# Patient Record
Sex: MALE | Race: WHITE | HISPANIC OR LATINO | Employment: UNEMPLOYED | URBAN - METROPOLITAN AREA
[De-identification: names, ages, dates, MRNs, and addresses within clinical notes are randomized per-mention and may not be internally consistent; named-entity substitution may affect disease eponyms.]

---

## 2017-01-03 ENCOUNTER — HOSPITAL ENCOUNTER (EMERGENCY)
Facility: HOSPITAL | Age: 32
Discharge: HOME | End: 2017-01-04

## 2017-01-03 VITALS
HEART RATE: 91 BPM | RESPIRATION RATE: 18 BRPM | SYSTOLIC BLOOD PRESSURE: 161 MMHG | WEIGHT: 180 LBS | HEIGHT: 72 IN | DIASTOLIC BLOOD PRESSURE: 90 MMHG | TEMPERATURE: 98 F | OXYGEN SATURATION: 97 % | BODY MASS INDEX: 24.38 KG/M2

## 2017-01-03 DIAGNOSIS — S93.401A SPRAIN OF RIGHT ANKLE, UNSPECIFIED LIGAMENT, INITIAL ENCOUNTER: Primary | ICD-10-CM

## 2017-01-03 DIAGNOSIS — M79.673 FOOT PAIN: ICD-10-CM

## 2017-01-03 DIAGNOSIS — S99.911A RIGHT ANKLE INJURY: ICD-10-CM

## 2017-01-03 PROCEDURE — 99283 EMERGENCY DEPT VISIT LOW MDM: CPT

## 2017-01-03 RX ORDER — PREDNISONE 20 MG/1
40 TABLET ORAL DAILY
Qty: 10 TABLET | Refills: 0 | Status: SHIPPED | OUTPATIENT
Start: 2017-01-03 | End: 2017-01-08

## 2017-01-03 NOTE — ED AVS SNAPSHOT
[facility]  [address] [place] [address]               Ney Quintanilla   1/3/2017  9:25 PM   ED    Descripción:  Male : 1985   Departamento:  [facility]           Fernandez Cuidado fue coordinado por:     Provider Role From To    LASHAUN Mead Physician Assistant 17 1731 --      Razón de la arun     Ankle Pain           Diagnósticos de Esta Visita        Comentarios    Sprain of right ankle, unspecified ligament, initial encounter    -  Primario     Right ankle injury         Foot pain           ED Disposition     ED Disposition Condition Comment    Discharge             Lista de tareas           Información de seguimiento     Realice un seguimiento con:  Primary Doctor No    Cuándo:        Recetas para recoger        Disp Refills Start End    predniSONE (DELTASONE) 20 MG tablet 10 tablet 0 1/3/2017 2017    Take 2 tablets (40 mg total) by mouth once daily. - Oral      [facility]     [facility] Enfermeras - Asistencia   Enfermeras registradas de [facility] pueden ayudarle a reservar christina arun, proveer educación para la shruthi, asesoría clínica, y otros servicios de asesoramiento.   Llame para chase servicio gratuito a [phone].             Medicamentos           Mensaje sobre Medicamentos     Verificar los cambios y / o adiciones a fernandez régimen de medicación son los mismos que discutir con fernandez médico. Si cualquiera de estos cambios o adiciones son incorrectos, por favor notifique a fernandez proveedor de atención médica.        EMPEZAR a christelle estos medicamentos NUEVOS        Refills    predniSONE (DELTASONE) 20 MG tablet 0    Sig: Take 2 tablets (40 mg total) by mouth once daily.    Categoría: Print    Vía: Oral           Verifique que la siguiente lista de medicamentos es christina representación exacta de los medicamentos que está tomando actualmente. Si no hay ningunos reportados, la lista puede estar en nicole.  Si no es correcta, por favor póngase en contacto con [place] proveedor de atención médica. Lleve esta lista con usted en dada de emergencia.           Medicamentos Actuales     alprazolam (XANAX) 2 MG Tab Take by mouth 2 (two) times daily as needed.    butalbital-acetaminophen-caffeine -40 mg (FIORICET) -40 mg per tablet Take 1-2 tablets by mouth every 6 (six) hours as needed for Pain.    clonazePAM (KLONOPIN) 1 MG tablet Take 1 tablet (1 mg total) by mouth 3 (three) times daily as needed for Anxiety.    CLONIDINE HCL ORAL Take 0.2 mg by mouth 2 (two) times daily.     hydrochlorothiazide (HYDRODIURIL) 12.5 MG Tab Take 2 tablets (25 mg total) by mouth once daily.    hydrocodone-acetaminophen 5-325mg (NORCO) 5-325 mg per tablet Take 1 tablet by mouth every 4 (four) hours as needed.    ondansetron (ZOFRAN) 4 MG tablet Take 1 tablet (4 mg total) by mouth every 8 (eight) hours as needed.    predniSONE (DELTASONE) 20 MG tablet Take 2 tablets (40 mg total) by mouth once daily.           Información de referencia clínica           Janet signos vitales ac     PS Pulso Temperatura Resp [place] [employer]    161/90 (BP Location: Left arm, Patient Position: Sitting) 91 98 °F (36.7 °C) (Oral) 18 6' (1.829 m) 81.6 kg (180 lb)    SpO2 BMI (IM)                97% 24.41 kg/m2          Alergias     A partir del:  1/3/2017           Reacciones    Flexeril [Cyclobenzaprine]     Ibuprofen     Toradol [Ketorolac] Itching    Tramadol       Vacunas     Administradas en la fecha de la visita:  1/3/2017        None      ED Micro, Lab, POCT     None      ED Imaging Orders     Start Ordered       Status Ordering Provider    01/03/17 2128 01/03/17 2129  X-Ray Foot Complete Right  1 time imaging      Acknowledged     01/03/17 2121 01/03/17 2120  X-Ray Ankle Complete Right  1 time imaging      Acknowledged         Instrucciones a dileep de kendall           Understanding Ankle Sprain    The ankle is the joint where the leg and foot meet. Bones are  held in place by connective tissue called ligaments. When ankle ligaments are stretched to the point of pain and injury, it is called an ankle sprain. A sprain can tear the ligaments. These tears can be very small but still cause pain. Ankle sprains can be mild or severe.  What causes an ankle sprain?  A sprain may occur when you twist your ankle or bend it too far. This can happen when you stumble or fall. Things that can make an ankle sprain more likely include:  · Having had an ankle sprain before  · Playing sports that involve running and jumping. Or playing contact sports such as football or hockey.  · Wearing shoes that dont support your feet and ankles well  · Having ankles with poor strength and flexibility  Symptoms of an ankle sprain  Symptoms may include:  · Pain or soreness in the ankle  · Swelling  · Redness or bruising  · Not being able to walk or put weight on the affected foot  · Reduced range of motion in the ankle  · A popping or tearing feeling at the time the sprain occurs  · An abnormal or dislocated look to the ankle  · Instability or too much range of motion in the ankle  Treatment for an ankle sprain  Treatment focuses on reducing pain and swelling, and avoiding further injury. Treatments may include:  · Resting the ankle. Avoid putting weight on it. This may mean using crutches until the sprain heals.  · Prescription or over-the-counter pain medicines. These help reduce swelling and pain.  · Cold packs. These help reduce pain and swelling.  · Raising your ankle above your heart. This helps reduce swelling.  · Wrapping the ankle with an elastic bandage or ankle brace. This helps reduce swelling and gives some support to the ankle. In rare cases, you may need a cast or boot.  · Stretching and other exercises. These improve flexibility and strength.  · Heat packs. These may be recommended before doing ankle exercises.  Possible complications of an ankle sprain  An ankle that has been weakened  by a sprain can be more likely to have repeated sprains afterward. Doing exercises to strengthen your ankle and improve balance can reduce your risk for repeated sprains. Other possible complications are long-term (chronic) pain or an ankle that remains unstable.  When to call your healthcare provider  Call your healthcare provider right away if you have any of these:  · Fever of 100.4°F (38°C) or higher, or as directed  · Pain, numbness, discoloration, or coldness in the foot or toes  · Pain that gets worse  · Symptoms that dont get better, or get worse  · New symptoms   © 6042-8231 [employer]. [address] [place] [address]. All rights reserved. This information is not intended as a substitute for professional medical care. Always follow your healthcare professional's instructions.          Treating Ankle Sprains  Treatment will depend on how bad your sprain is. For a severe sprain, healing may take 3 months or more.  Right after your injury: Use R.I.C.E.  · Rest: At first, keep weight off the ankle as much as you can. You may be given crutches to help you walk without putting weight on the ankle.  · Ice: Put an ice pack on the ankle for 15 minutes. Remove the pack and wait at least 30 minutes. Repeat for up to 3 days. This helps reduce swelling.  · Compression: To reduce swelling and keep the joint stable, you may need to wrap the ankle with an elastic bandage. For more severe sprains, you may need an ankle brace or a cast.  · Elevation: To reduce swelling, keep your ankle raised above your heart when you sit or lie down.  Medicine  Your healthcare provider may suggest oral non-steroidal anti-inflammatory medicine (NSAIDs), such as ibuprofen. This relieves the pain and helps reduce any swelling. Be sure to take your medicine as directed.  Contrast baths  After 3 days, soak your ankle in warm water for 30 seconds, then in cool water for 30 seconds. Go back and forth for 5 minutes. Doing  this every 2 hours will help keep the swelling down.  Exercises    After about 2 to 3 weeks, you may be given exercises to strengthen the ligaments and muscles in the ankle. Doing these exercises will help prevent another ankle sprain. Exercises may include standing on your toes and then on your heels and doing ankle curls.  Ankle curls  · Sit on the edge of a sturdy table or lie on your back.  · Pull your toes toward you. Then point them away from you. Repeat for 2 to 3 minutes.   © 8060-8995 [employer]. [address] [place] [address]. All rights reserved. This information is not intended as a substitute for professional medical care. Always follow your healthcare professional's instructions.          Registrarse para [facility]     La activación de [place] cuenta [facility] es tan fácil joanna 1-2-3!    1) Ir a [email or web address], seleccione Registrarse Ahora, meter el código de activación y [place] fecha de nacimiento, y seleccione Próximo.    [id number]  Expires: 2/17/2017  9:49 PM      2) Crear un nombre de usuario y contraseña para usar cuando se visita [facility] en el futuro y selecciona christina pregunta de seguridad en dada de que pierda [place] contraseña y seleccione Próximo.    3) Introduzca [place] dirección de correo electrónico y violet clic en Registrarse!    Información Adicional  Si tiene alguna pregunta, por favor, e-mail [email or web address] o llame al [phone] para hablar con nuestro personal. Recuerde, [facility] no debe ser usada para necesidades urgentes. En dada de emergencia médica, llame al [phone].        Smoking Cessation     Si desea dejar de fumar:  · Usted puede ser elegible para recibir servicios gratuitos si usted es un residente de [place] y comenzó a fumar cigarrillos antes del 1 de septiembre de 1988. Llame al Smoking Cessation Trust (SCT) a [phone] o [phone].   Llame 1-800-QUIT-NOW si usted no cumple con los criterios anteriores.             [facility] cumple con las leyes federales aplicables de derechos civiles y no discrimina por motivos de nicole, color, origen nacional, edad, discapacidad, o sexo.        Language Assistance Services     ATTENTION: Language assistance services are available, free of charge. Please call [phone].      ATENCIÓN: Si habla español, tiene a [place] disposición servicios gratuitos de asistencia lingüística. Llame al [phone].     CHÚ Ý: N?u b?n nói Ti?ng Vi?t, có các d?ch v? h? tr? ngôn ng? mi?n phí dành cho b?n. G?i s? [phone].                      [facility]   [address] [address]               Ney Quintanilla   1/3/2017  9:25 PM   ED    Description:  Male : 1985   Department:  [facility]           Your Care was Coordinated By:     Provider Role From To    LASHAUN Mead Physician Assistant 17 2302 --      Reason for Visit     Ankle Pain           Diagnoses this Visit        Comments    Sprain of right ankle, unspecified ligament, initial encounter    -  Primary     Right ankle injury         Foot pain           ED Disposition     ED Disposition Condition Comment    Discharge             To Do List           Follow-up Information     Follow up with Primary Doctor No In 1 week(s).       These Medications        Disp Refills Start End    predniSONE (DELTASONE) 20 MG tablet 10 tablet 0 1/3/2017 2017    Take 2 tablets (40 mg total) by mouth once daily. - Oral      [facility]     [facility] -  Assistance  Registered nurses in the [facility] provide clinical advisement, health education, appointment booking, and other advisory services.  Call for this free service at [phone].             Medications           Message regarding Medications     Verify the changes and/or additions to your medication regime listed below are the same as discussed with your clinician today.  If  any of these changes or additions are incorrect, please notify your healthcare provider.        START taking these NEW medications        Refills    predniSONE (DELTASONE) 20 MG tablet 0    Sig: Take 2 tablets (40 mg total) by mouth once daily.    Class: Print    Route: Oral           Verify that the below list of medications is an accurate representation of the medications you are currently taking.  If none reported, the list may be blank. If incorrect, please contact your healthcare provider. Carry this list with you in case of emergency.           Current Medications     alprazolam (XANAX) 2 MG Tab Take by mouth 2 (two) times daily as needed.    butalbital-acetaminophen-caffeine -40 mg (FIORICET) -40 mg per tablet Take 1-2 tablets by mouth every 6 (six) hours as needed for Pain.    clonazePAM (KLONOPIN) 1 MG tablet Take 1 tablet (1 mg total) by mouth 3 (three) times daily as needed for Anxiety.    CLONIDINE HCL ORAL Take 0.2 mg by mouth 2 (two) times daily.     hydrochlorothiazide (HYDRODIURIL) 12.5 MG Tab Take 2 tablets (25 mg total) by mouth once daily.    hydrocodone-acetaminophen 5-325mg (NORCO) 5-325 mg per tablet Take 1 tablet by mouth every 4 (four) hours as needed.    ondansetron (ZOFRAN) 4 MG tablet Take 1 tablet (4 mg total) by mouth every 8 (eight) hours as needed.    predniSONE (DELTASONE) 20 MG tablet Take 2 tablets (40 mg total) by mouth once daily.           Clinical Reference Information           Your Vitals Were     BP Pulse Temp Resp Height Weight    161/90 (BP Location: Left arm, Patient Position: Sitting) 91 98 °F (36.7 °C) (Oral) 18 6' (1.829 m) 81.6 kg (180 lb)    SpO2 BMI                97% 24.41 kg/m2          Allergies as of 1/3/2017        Reactions    Flexeril [Cyclobenzaprine]     Ibuprofen     Toradol [Ketorolac] Itching    Tramadol       Immunizations Administered on Date of Encounter - 1/3/2017     None      ED Micro, Lab, POCT     None      ED Imaging Orders     Start  Ordered       Status Ordering Provider    01/03/17 2128 01/03/17 2129  X-Ray Foot Complete Right  1 time imaging      Acknowledged     01/03/17 2121 01/03/17 2120  X-Ray Ankle Complete Right  1 time imaging      Acknowledged         Discharge Instructions           Understanding Ankle Sprain    The ankle is the joint where the leg and foot meet. Bones are held in place by connective tissue called ligaments. When ankle ligaments are stretched to the point of pain and injury, it is called an ankle sprain. A sprain can tear the ligaments. These tears can be very small but still cause pain. Ankle sprains can be mild or severe.  What causes an ankle sprain?  A sprain may occur when you twist your ankle or bend it too far. This can happen when you stumble or fall. Things that can make an ankle sprain more likely include:  · Having had an ankle sprain before  · Playing sports that involve running and jumping. Or playing contact sports such as football or hockey.  · Wearing shoes that dont support your feet and ankles well  · Having ankles with poor strength and flexibility  Symptoms of an ankle sprain  Symptoms may include:  · Pain or soreness in the ankle  · Swelling  · Redness or bruising  · Not being able to walk or put weight on the affected foot  · Reduced range of motion in the ankle  · A popping or tearing feeling at the time the sprain occurs  · An abnormal or dislocated look to the ankle  · Instability or too much range of motion in the ankle  Treatment for an ankle sprain  Treatment focuses on reducing pain and swelling, and avoiding further injury. Treatments may include:  · Resting the ankle. Avoid putting weight on it. This may mean using crutches until the sprain heals.  · Prescription or over-the-counter pain medicines. These help reduce swelling and pain.  · Cold packs. These help reduce pain and swelling.  · Raising your ankle above your heart. This helps reduce swelling.  · Wrapping the ankle with an  elastic bandage or ankle brace. This helps reduce swelling and gives some support to the ankle. In rare cases, you may need a cast or boot.  · Stretching and other exercises. These improve flexibility and strength.  · Heat packs. These may be recommended before doing ankle exercises.  Possible complications of an ankle sprain  An ankle that has been weakened by a sprain can be more likely to have repeated sprains afterward. Doing exercises to strengthen your ankle and improve balance can reduce your risk for repeated sprains. Other possible complications are long-term (chronic) pain or an ankle that remains unstable.  When to call your healthcare provider  Call your healthcare provider right away if you have any of these:  · Fever of 100.4°F (38°C) or higher, or as directed  · Pain, numbness, discoloration, or coldness in the foot or toes  · Pain that gets worse  · Symptoms that dont get better, or get worse  · New symptoms   © 2227-9580 "[employer] [address] [place] [address]. All rights reserved. This information is not intended as a substitute for professional medical care. Always follow your healthcare professional's instructions.          Treating Ankle Sprains  Treatment will depend on how bad your sprain is. For a severe sprain, healing may take 3 months or more.  Right after your injury: Use R.I.C.E.  · Rest: At first, keep weight off the ankle as much as you can. You may be given crutches to help you walk without putting weight on the ankle.  · Ice: Put an ice pack on the ankle for 15 minutes. Remove the pack and wait at least 30 minutes. Repeat for up to 3 days. This helps reduce swelling.  · Compression: To reduce swelling and keep the joint stable, you may need to wrap the ankle with an elastic bandage. For more severe sprains, you may need an ankle brace or a cast.  · Elevation: To reduce swelling, keep your ankle raised above your heart when you sit or lie  down.  Medicine  Your healthcare provider may suggest oral non-steroidal anti-inflammatory medicine (NSAIDs), such as ibuprofen. This relieves the pain and helps reduce any swelling. Be sure to take your medicine as directed.  Contrast baths  After 3 days, soak your ankle in warm water for 30 seconds, then in cool water for 30 seconds. Go back and forth for 5 minutes. Doing this every 2 hours will help keep the swelling down.  Exercises    After about 2 to 3 weeks, you may be given exercises to strengthen the ligaments and muscles in the ankle. Doing these exercises will help prevent another ankle sprain. Exercises may include standing on your toes and then on your heels and doing ankle curls.  Ankle curls  · Sit on the edge of a sturdy table or lie on your back.  · Pull your toes toward you. Then point them away from you. Repeat for 2 to 3 minutes.   © 5199-7849 [employer]. [address] [place] [address]. All rights reserved. This information is not intended as a substitute for professional medical care. Always follow your healthcare professional's instructions.          [facility] Sign-Up     Activating your [facility] account is as easy as 1-2-3!     1) Visit [email or web address], select Sign Up Now, enter this activation code and your date of birth, then select Next.  [email or web address]  Expires: 2/17/2017  9:49 PM      2) Create a username and password to use when you visit [facility] in the future and select a security question in case you lose your password and select Next.    3) Enter your e-mail address and click Sign Up!    Additional Information  If you have questions, please e-mail [email or web address] or call [phone] to talk to our [facility] staff. Remember, [facility] is NOT to be used for urgent needs. For medical emergencies, dial 911.         Smoking Cessation     If you would like to quit smoking:   You may be eligible for free services if you are a [place] resident and  started smoking cigarettes before September 1, 1988.  Call the Smoking Cessation Trust (SCT) toll free at (648) 625-1123 or (977) 462-3855.   Call 1-800-QUIT-NOW if you do not meet the above criteria.             [facility] complies with applicable Federal civil rights laws and does not discriminate on the basis of race, color, national origin, age, disability, or sex.        Language Assistance Services     ATTENTION: Language assistance services are available, free of charge. Please call [phone].      ATENCIÓN: Si habla español, tiene a [place] disposición servicios gratuitos de asistencia lingüística. Llame al [phone].     CHÚ Ý: N?u b?n nói Ti?ng Vi?t, có các d?ch v? h? tr? ngôn ng? mi?n phí dành cho b?n. G?i s? [phone].

## 2017-01-04 NOTE — ED NOTES
Patient identifiers verified and correct for Ney Quintanilla.    LOC: The patient is awake, alert and aware of environment with an appropriate affect, the patient is oriented x 3 and speaking appropriately.  APPEARANCE: Patient resting comfortably and in no acute distress, patient is clean and well groomed, patient's clothing is properly fastened.  SKIN: The skin is warm and dry, color consistent with ethnicity, patient has normal skin turgor and moist mucus membranes, skin intact, no breakdown or bruising noted.  MUSCULOSKELETAL: R ankle swelling s/p fall.   RESPIRATORY: Airway is open and patent, respirations are spontaneous.  CARDIAC: Patient has a normal rate, no periphreal edema noted, capillary refill < 3 seconds.  ABDOMEN: Soft and non tender to palpation.

## 2017-01-04 NOTE — DISCHARGE INSTRUCTIONS
Understanding Ankle Sprain    The ankle is the joint where the leg and foot meet. Bones are held in place by connective tissue called ligaments. When ankle ligaments are stretched to the point of pain and injury, it is called an ankle sprain. A sprain can tear the ligaments. These tears can be very small but still cause pain. Ankle sprains can be mild or severe.  What causes an ankle sprain?  A sprain may occur when you twist your ankle or bend it too far. This can happen when you stumble or fall. Things that can make an ankle sprain more likely include:  · Having had an ankle sprain before  · Playing sports that involve running and jumping. Or playing contact sports such as football or hockey.  · Wearing shoes that dont support your feet and ankles well  · Having ankles with poor strength and flexibility  Symptoms of an ankle sprain  Symptoms may include:  · Pain or soreness in the ankle  · Swelling  · Redness or bruising  · Not being able to walk or put weight on the affected foot  · Reduced range of motion in the ankle  · A popping or tearing feeling at the time the sprain occurs  · An abnormal or dislocated look to the ankle  · Instability or too much range of motion in the ankle  Treatment for an ankle sprain  Treatment focuses on reducing pain and swelling, and avoiding further injury. Treatments may include:  · Resting the ankle. Avoid putting weight on it. This may mean using crutches until the sprain heals.  · Prescription or over-the-counter pain medicines. These help reduce swelling and pain.  · Cold packs. These help reduce pain and swelling.  · Raising your ankle above your heart. This helps reduce swelling.  · Wrapping the ankle with an elastic bandage or ankle brace. This helps reduce swelling and gives some support to the ankle. In rare cases, you may need a cast or boot.  · Stretching and other exercises. These improve flexibility and strength.  · Heat packs. These may be recommended before  doing ankle exercises.  Possible complications of an ankle sprain  An ankle that has been weakened by a sprain can be more likely to have repeated sprains afterward. Doing exercises to strengthen your ankle and improve balance can reduce your risk for repeated sprains. Other possible complications are long-term (chronic) pain or an ankle that remains unstable.  When to call your healthcare provider  Call your healthcare provider right away if you have any of these:  · Fever of 100.4°F (38°C) or higher, or as directed  · Pain, numbness, discoloration, or coldness in the foot or toes  · Pain that gets worse  · Symptoms that dont get better, or get worse  · New symptoms   © 0125-0643 [employer]. [address] [place] [address], [place] [address]. All rights reserved. This information is not intended as a substitute for professional medical care. Always follow your healthcare professional's instructions.          Treating Ankle Sprains  Treatment will depend on how bad your sprain is. For a severe sprain, healing may take 3 months or more.  Right after your injury: Use R.I.C.E.  · Rest: At first, keep weight off the ankle as much as you can. You may be given crutches to help you walk without putting weight on the ankle.  · Ice: Put an ice pack on the ankle for 15 minutes. Remove the pack and wait at least 30 minutes. Repeat for up to 3 days. This helps reduce swelling.  · Compression: To reduce swelling and keep the joint stable, you may need to wrap the ankle with an elastic bandage. For more severe sprains, you may need an ankle brace or a cast.  · Elevation: To reduce swelling, keep your ankle raised above your heart when you sit or lie down.  Medicine  Your healthcare provider may suggest oral non-steroidal anti-inflammatory medicine (NSAIDs), such as ibuprofen. This relieves the pain and helps reduce any swelling. Be sure to take your medicine as directed.  Contrast baths  After 3 days, soak your ankle in  warm water for 30 seconds, then in cool water for 30 seconds. Go back and forth for 5 minutes. Doing this every 2 hours will help keep the swelling down.  Exercises    After about 2 to 3 weeks, you may be given exercises to strengthen the ligaments and muscles in the ankle. Doing these exercises will help prevent another ankle sprain. Exercises may include standing on your toes and then on your heels and doing ankle curls.  Ankle curls  · Sit on the edge of a sturdy table or lie on your back.  · Pull your toes toward you. Then point them away from you. Repeat for 2 to 3 minutes.   © 8613-8825 [employer]. [address] [place] [address], [place] [address]. All rights reserved. This information is not intended as a substitute for professional medical care. Always follow your healthcare professional's instructions.

## 2017-01-04 NOTE — ED PROVIDER NOTES
SCRIBE #1 NOTE: I, Aura Morin, am scribing for, and in the presence of, LASHAUN Castaneda. I have scribed the entire note.      History      Chief Complaint   Patient presents with    Ankle Pain     twisted rt ankle earlier today       Review of patient's allergies indicates:   Allergen Reactions    Flexeril [cyclobenzaprine]     Ibuprofen     Toradol [ketorolac] Itching    Tramadol         HPI   HPI    1/3/2017, 9:26 PM   History obtained from the patient      History of Present Illness: Ney Quintanilla is a 31 y.o. male patient who presents to the Emergency Department for R ankle injury which onset suddenly after falling off the ladder and twisting his ankle earlier today. Symptoms are constant and mild in severity. Pt does not report any factors that exacerbate or improve the symptoms. No other sxs reported. Patient denies extremity weakness/numbness, paresthesias, saddle anesthesia, joint swelling, bruising, erythema, and all other sxs at this time. No further complaints or concerns at this time.       Arrival mode: Personal vehicle    PCP: Primary Doctor No       Past Medical History:  Past Medical History   Diagnosis Date    Anxiety     GERD (gastroesophageal reflux disease)     Hypertension        Past Surgical History:  History reviewed. No pertinent past surgical history.      Family History:  Family History   Problem Relation Age of Onset    Hypertension Mother     Diabetes Mother     Heart disease Father     Hypertension Father     Diabetes Father        Social History:  Social History     Social History Main Topics    Smoking status: Current Every Day Smoker     Packs/day: 0.50     Types: Cigarettes    Smokeless tobacco: Never Used    Alcohol use Yes    Drug use: Yes     Special: Cocaine    Sexual activity: Yes       ROS   Review of Systems   Constitutional: Negative for fever.   HENT: Negative for sore throat.    Respiratory: Negative for shortness of breath.    Cardiovascular:  Negative for chest pain.   Gastrointestinal: Negative for nausea.   Genitourinary: Negative for dysuria.   Musculoskeletal: Negative for back pain, gait problem and joint swelling.        (+) R ankle pain   Skin: Negative for color change and rash.   Neurological: Negative for weakness and numbness.        (-) paresthesias   Hematological: Does not bruise/bleed easily.   All other systems reviewed and are negative.      Physical Exam    Initial Vitals   BP Pulse Resp Temp SpO2   01/03/17 2042 01/03/17 2042 01/03/17 2042 01/03/17 2042 01/03/17 2042   161/90 91 18 98 °F (36.7 °C) 97 %      Physical Exam  Nursing Notes and Vital Signs Reviewed.  Constitutional: Patient is in no acute distress. Awake and alert. Well-developed and well-nourished.  Head: Atraumatic. Normocephalic.  Eyes: PERRL. EOM intact. Conjunctivae are not pale. No scleral icterus.  ENT: Mucous membranes are moist. Oropharynx is clear and symmetric.    Neck: Supple. Full ROM. No lymphadenopathy.  Cardiovascular: Regular rate. Regular rhythm. No murmurs, rubs, or gallops. Distal pulses are 2+ and symmetric.  Pulmonary/Chest: No respiratory distress. Clear to auscultation bilaterally. No wheezing, rales, or rhonchi.  Abdominal: Soft and non-distended.  There is no tenderness.  No rebound, guarding, or rigidity.  Good bowel sounds.    Musculoskeletal: Moves all extremities. No obvious deformities.  Right foot: No obvious deformity. Tenderness to dorsal aspect of right foot. There is no swelling. Distal capillary refill takes less than 2 seconds. PT and DP pulses are 2+ bilaterally.  Skin: Warm and dry.  Neurological:  Alert, awake, and appropriate.  Normal speech.  No acute focal neurological deficits are appreciated.  Psychiatric: Normal affect. Good eye contact. Appropriate in content.    ED Course    Procedures  ED Vital Signs:  Vitals:    01/03/17 2042   BP: (!) 161/90   Pulse: 91   Resp: 18   Temp: 98 °F (36.7 °C)   TempSrc: Oral   SpO2: 97%    Weight: 81.6 kg (180 lb)   Height: 6' (1.829 m)     Imaging Results:  Imaging Results         X-Ray Foot Complete Right (Final result) Result time:  01/03/17 22:12:35    Final result by Jose A Gonzalez MD (01/03/17 22:12:35)    Impression:         Negative right foot fracture.      Electronically signed by: JOSE A GONZALEZ MD  Date:     01/03/17  Time:    22:12     Narrative:    Exam: XR FOOT COMPLETE 3 VIEW RIGHT,    Date:  01/03/17 21:56:15    History: Left foot pain    Comparison:  No prior  studies available for comparison.    Findings:     No acute fracture dislocation left foot.  Lucency identified proximal phalanx of the right 2nd toe on the oblique image only most likely represents artifact.  Joint spaces are well-maintained.  Soft tissues are normal.            X-Ray Ankle Complete Right (Final result) Result time:  01/03/17 22:15:06    Final result by Jose A Gonzalez MD (01/03/17 22:15:06)    Impression:         Negative for acute fracture dislocation.  No change since 09/06/2016.      Electronically signed by: JOSE A GONZALEZ MD  Date:     01/03/17  Time:    22:15     Narrative:    Exam: XR ANKLE COMPLETE 3 VIEW RIGHT,    Date:  01/03/17 21:56:12        History: Right ankle pain right ankle injury.    Comparison exam: 09/06/2016.    Findings:     No fracture or dislocation of the right ankle.  Joint spaces well-maintained.  Soft tissues normal.               The Emergency Provider reviewed the vital signs and test results, which are outlined above.    ED Discussion     9:50 PM: Reassessed pt at this time. Pt states his condition has improved at this time. Discussed with pt imaging results. Discussed pt dx and plan of tx. Informed pt to follow up with PCP. All questions and concerns were addressed at this time. Pt expresses understanding of information and instructions, and is comfortable with plan to discharge. Pt is stable for discharge.    I discussed with patient and/or  family/caretaker that negative X-ray does not rule out occult fracture or other soft tissue injury.  Persistent pain greater than 7-10 days or increased pain requires follow up, specifically with orthopedics.       ED Medication(s):  Medications - No data to display    Discharge Medication List as of 1/3/2017  9:49 PM      START taking these medications    Details   predniSONE (DELTASONE) 20 MG tablet Take 2 tablets (40 mg total) by mouth once daily., Starting 1/3/2017, Until Sun 1/8/17, Print             Follow-up Information     Follow up with Primary Doctor No In 1 week(s).           Medical Decision Making    Medical Decision Making:   Clinical Tests:   Radiological Study: Ordered and Reviewed           Scribe Attestation:   Scribe #1: I performed the above scribed service and the documentation accurately describes the services I performed. I attest to the accuracy of the note.    Attending:   Physician Attestation Statement for Scribe #1: I, LASHAUN Castaneda, personally performed the services described in this documentation, as scribed by Aura Morin, in my presence, and it is both accurate and complete.          Clinical Impression       ICD-10-CM ICD-9-CM   1. Sprain of right ankle, unspecified ligament, initial encounter S93.401A 845.00   2. Right ankle injury S99.911A 959.7   3. Foot pain M79.673 729.5       Disposition:   Disposition: Discharged  Condition: Stable         LASHAUN Mead  01/06/17 0759

## 2017-01-04 NOTE — ED NOTES
Bed: RWR 02  Expected date:   Expected time:   Means of arrival:   Comments:     LASHAUN Mead  01/03/17 3625

## 2017-03-24 ENCOUNTER — HOSPITAL ENCOUNTER (EMERGENCY)
Facility: HOSPITAL | Age: 32
Discharge: HOME | End: 2017-03-25
Attending: INTERNAL MEDICINE

## 2017-03-24 DIAGNOSIS — R10.31 RLQ ABDOMINAL PAIN: Primary | ICD-10-CM

## 2017-03-24 DIAGNOSIS — K52.9 COLITIS: ICD-10-CM

## 2017-03-24 PROCEDURE — 96374 THER/PROPH/DIAG INJ IV PUSH: CPT

## 2017-03-24 PROCEDURE — 96361 HYDRATE IV INFUSION ADD-ON: CPT

## 2017-03-24 PROCEDURE — 99284 EMERGENCY DEPT VISIT MOD MDM: CPT | Mod: 25

## 2017-03-24 NOTE — ED AVS SNAPSHOT
[facility]  [address] [facility] [address]               Ney Quintanilla   3/24/2017 11:43 PM   ED    Descripción:  Male : 1985   Departamento:  [facility]           [place] Cuidado fue coordinado por:     Provider Role From To    Meghan Goodwin MD Attending Provider 17 [phone] --      Razón de la arun     Abdominal Pain           Diagnósticos de Esta Visita        Comentarios    RLQ abdominal pain    -  Primario     Colitis           ED Disposition     Ninguna           Lista de tareas           Información de seguimiento     Realice un seguimiento con:  [facility]    Cuándo:  3/28/2017    Especialidad:  Emergency Medicine    Por qué:  If symptoms worsen    Información de contacto:    [address] [facility] [id number] [phone]      Recetas para recoger        Disp Refills Start End    ciprofloxacin HCl (CIPRO) 500 MG tablet 20 tablet 0 3/25/2017 2017    Take 1 tablet (500 mg total) by mouth 2 (two) times daily. - Oral    metronidazole (FLAGYL) 500 MG tablet 21 tablet 0 3/25/2017 2017    Take 1 tablet (500 mg total) by mouth 3 (three) times daily. - Oral    hyoscyamine (ANASPAZ,LEVSIN) 0.125 mg Tab 30 tablet 0 3/25/2017 2017    Take 1 tablet (125 mcg total) by mouth every 6 (six) hours as needed. - Oral      [facility]     [facility] de Enfermeras - Asistencia   Enfermeras registradas de [facility] pueden ayudarle a reservar christina arun, proveer educación para la shruthi, asesoría clínica, y otros servicios de asesoramiento.   Llame para chase servicio gratuito a [phone].             Medicamentos           Mensaje sobre Medicamentos     Verificar los cambios y / o adiciones a [place] régimen de medicación son los mismos que discutir con [place] médico. Si cualquiera de estos cambios o adiciones son incorrectos, por favor notifique a [place] proveedor de atención  médica.        EMPEZAR a christelle estos medicamentos NUEVOS        Refills    ciprofloxacin HCl (CIPRO) 500 MG tablet 0    Sig: Take 1 tablet (500 mg total) by mouth 2 (two) times daily.    Categoría: Print    Vía: Oral    metronidazole (FLAGYL) 500 MG tablet 0    Sig: Take 1 tablet (500 mg total) by mouth 3 (three) times daily.    Categoría: Print    Vía: Oral    hyoscyamine (ANASPAZ,LEVSIN) 0.125 mg Tab 0    Sig: Take 1 tablet (125 mcg total) by mouth every 6 (six) hours as needed.    Categoría: Print    Vía: Oral      These medications were administered today        Dose Freq    lactated ringers bolus 1,000 mL 1,000 mL ED 1 Time    Empezando el: 3/25/2017    Sig: Inject 1,000 mLs into the vein ED 1 Time.    Categoría: Normal    Vía: Intravenous    hydromorphone (PF) injection 2 mg 2 mg ED 1 Time    Sig: Inject 1 mL (2 mg total) into the vein ED 1 Time.    Categoría: Normal    Vía: Intravenous    ondansetron disintegrating tablet 8 mg 8 mg ED 1 Time    Sig: Take 1 tablet (8 mg total) by mouth ED 1 Time.    Categoría: Normal    Vía: Oral    omnipaque 350 iohexol 75 mL 75 mL IMG once as needed    Sig: Inject 75 mLs into the vein ONCE PRN for contrast.    Categoría: Normal    Vía: Intravenous           Verifique que la siguiente lista de medicamentos es christina representación exacta de los medicamentos que está tomando actualmente. Si no hay ningunos reportados, la lista puede estar en nicole. Si no es correcta, por favor póngase en contacto con [place] proveedor de atención médica. Lleve esta lista con usted en dada de emergencia.           Medicamentos Actuales     alprazolam (XANAX) 2 MG Tab Take by mouth 2 (two) times daily as needed.    clonazePAM (KLONOPIN) 1 MG tablet Take 1 tablet (1 mg total) by mouth 3 (three) times daily as needed for Anxiety.    CLONIDINE HCL ORAL Take 0.2 mg by mouth 2 (two) times daily.     hydrochlorothiazide (HYDRODIURIL) 12.5 MG Tab Take 2 tablets (25 mg total) by mouth once daily.     hydrocodone-acetaminophen 5-325mg (NORCO) 5-325 mg per tablet Take 1 tablet by mouth every 4 (four) hours as needed.    ondansetron (ZOFRAN) 4 MG tablet Take 1 tablet (4 mg total) by mouth every 8 (eight) hours as needed.    ciprofloxacin HCl (CIPRO) 500 MG tablet Take 1 tablet (500 mg total) by mouth 2 (two) times daily.    hyoscyamine (ANASPAZ,LEVSIN) 0.125 mg Tab Take 1 tablet (125 mcg total) by mouth every 6 (six) hours as needed.    metronidazole (FLAGYL) 500 MG tablet Take 1 tablet (500 mg total) by mouth 3 (three) times daily.           Información de referencia clínica           Jante signos vitales ac     PS Pulso Temperatura Resp [place] Peso    132/78 81 97.9 °F (36.6 °C) (Oral) 22 6' (1.829 m) 81.6 kg (180 lb)    SpO2 BMI (IM)                98% 24.41 kg/m2          Alergias     A partir del:  3/25/2017           Reacciones    Flexeril [Cyclobenzaprine]     Ibuprofen     Toradol [Ketorolac] Itching    Tramadol       Vacunas     Administradas en la fecha de la visita:  3/25/2017        None      ED Micro, Lab, POCT     Start Ordered       Status Ordering Provider    03/24/17 2347 03/24/17 2347  CBC W/ AUTO DIFFERENTIAL  STAT      Final result     03/24/17 2347 03/24/17 2347  Comp. Metabolic Panel  STAT      Final result     03/24/17 2347 03/24/17 2347  Lipase  Once      Final result     03/24/17 2347 03/24/17 2347  Urinalysis - Clean Catch  STAT      Final result       ED Imaging Orders     Start Ordered       Status Ordering Provider    03/24/17 2347 03/24/17 2347  CT Abdomen Pelvis With Contrast  1 time imaging      In process         Instrucciones a dileep de kendall         Dolor Abdominal    El dolor abdominal es dolor en el vientre o en la jennifer del intestino. Todo el jeanna tiene chase tipo de dolor de vez en cuando. En muchos casos el dolor desaparece por sí solo. Yamilka en ciertas ocasiones el dolor abdominal puede ser consecuencia de un problema grave, joanna por ejemplo christina apendicitis. Por lo tanto, es  importante saber cuándo se debe obtener ayuda.  Causas del dolor abdominal  El dolor abdominal puede tener muchas causas. Entre las causas más comunes, en los adultos, se encuentran las siguientes:   · Estreñimiento, diarrea o gases  · Reflujo gastroesofágico (desplazamiento del ácido estomacal hacia el esófago, también llamado reflujo ácido o ardor estomacal)  · Úlcera péptica (lesión en el revestimiento interno del estómago o del intestino tinsley)  · Inflamación de la vesícula biliar, el hígado o el páncreas  · Cálculos biliares o renales  · Apendicitis  · Obstrucción del intestino  · Hernia (protrusión o abultamiento de un órgano interno a través de un músculo u otro tejido)  · Infecciones de las vías urinarias  · En las mujeres, cólicos menstruales, fibromas o endometriosis del útero  · Inflamación o infección del intestino  Diagnóstico de la causa del dolor abdominal  [place] proveedor de atención médica le hará un examen para ayudar a determinar la causa de [place] dolor. En dada necesario, le harán ciertas pruebas. El dolor abdominal puede ser difícil de diagnosticar porque honey causas pueden ser muy diversas. Los detalles que usted sepa dileep acerca de [place] dolor pueden resultar útiles. Diga a [place] proveedor de atención médica dónde y cuándo siente el dolor y qué cosas lo alivian o lo empeoran. Mencione también si tiene otros síntomas joanna fiebre, cansancio, náuseas, vómito o cambios en la frecuencia con que evacúa honey intestinos o [place] vejiga.  Tratamiento del dolor abdominal  Ciertas causas de chase dolor, joanna christina apendicitis o christina obstrucción intestinal, requieren tratamiento urgente. Otros problemas pueden tratarse mediante descanso, consumo de líquidos o medicamentos. [place] proveedor de atención médica le dará instrucciones específicas para el tratamiento que debe seguir o cómo debe cuidarse según la causa de [place] dolor.   Si tiene vómito o diarrea, simon pequeños sorbos de agua u otros líquidos nida. Cuando esté listo  para comer de nuevo alimentos sólidos, empiece comiendo pequeñas cantidades de cosas fáciles de digerir, joanna puré de manzanas, pan raphael o galletas saladas.   Cuándo debe llamar al médico  Llame al 911 o vaya al [facility] si tiene alguno de los siguientes síntomas:  · No puede defecar y está vomitando  · Está vomitando juan jose o tiene diarrea de color negruzco o muy oscuro  · Tiene también dolor en el pecho, en el paul o en el hombro  · Siente que está a punto de desmayarse  · Tiene dolor en los omóplatos, con náuseas  · Tiene un dolor repentino e insoportable en el abdomen  · Tiene un nuevo dolor distinto de todos los ab que ha tenido antes  · Tiene el vientre rígido, rajani y sensible al tacto  Llame a [place] médico si tiene:  · Dolor alison más de 5 días  · Abotargamiento (sensación de llenura e inflamiento) alison más de 2 días  · Diarrea alison más de 5 días  · Fiebre superior a 101°F o más  · Dolor que continúa aumentando  · Pérdida de peso sin motivo aparente  · Falta de apetito persistente  · Juan Jose en las heces  Cómo prevenir el dolor abdominal  Estos son algunos consejos para ayudar a prevenir el dolor abdominal:  · Coma cantidades más pequeñas de alimentos en cada comida.  · Evite los alimentos fritos o que contengan mucha grasa.  · Evite los alimentos que le producen gas.  · Mohsen ejercicio con regularidad.  · Genie abundantes líquidos.  Para ayudar a prevenir los síntomas del reflujo gastroesofágico:  · Deje de fumar.  · Genie menos alcohol y coma menos de esos alimentos que aumentan [place] acidez estomacal.  · Pierda el exceso de peso.  · Termine de comer joanna mínimo 2 horas antes de acostarse.  · Eleve un poco la cabecera de [place] cama.  Date Last Reviewed: 3/30/2014  © 7158-3215 [employer]. [address] [place] [id number]. Todos los derechos reservados. Esta información no pretende sustituir la atención médica profesional. Sólo [place] médico puede diagnosticar y tratar un  problema de shruthi.          Referencias/Adjuntos de kendall     GASTROENTERITIS, BACTERIAL (ADULT) (Yemeni)      Registrarse para [facility]     La activación de [place] cuenta [facility] es tan fácil joanna 1-2-3!    1) Ir a my.[email or web address] [email or web address], seleccione Registrarse Ahora, meter el código de activación y [place] fecha de nacimiento, y seleccione Próximo.    [id number]  Expires: 5/9/2017  1:38 AM      2) Crear un nombre de usuario y contraseña para usar cuando se visita [facility] en el futuro y selecciona christina pregunta de seguridad en dada de que pierda [place] contraseña y seleccione Próximo.    3) Introduzca [place] dirección de correo electrónico y violet clic en Registrarse!    Información Adicional  Si tiene alguna pregunta, por favor, e-mail [email or web address] o llame al [phone] para hablar con nuestro personal. Recuerde, [facility] no debe ser usada para necesidades urgentes. En dada de emergencia médica, llame al 911.        Smoking Cessation     Si desea dejar de fumar:  · Usted puede ser elegible para recibir servicios gratuitos si usted es un residente de [place] y comenzó a fumar cigarrillos antes del 1 de septiembre de 1988. Llame al Smoking Cessation Trust (SCT) a [phone] o [phone].   Llame 1-800-QUIT-NOW si usted no cumple con los criterios anteriores.             [facility]  cumple con las leyes federales aplicables de derechos civiles y no discrimina por motivos de nicole, color, origen nacional, edad, discapacidad, o sexo.        Language Assistance Services     ATTENTION: Language assistance services are available, free of charge. Please call [phone].      ATENCIÓN: Si habla español, tiene a [place] disposición servicios gratuitos de asistencia lingüística. Tamia al [phone].     ANAYELI Ý: N?u b?n nói Ti?ng Vi?t, có các d?ch v? h? tr? ngôn ng? mi?n phí dành cho b?n. G?i s? [phone].                      [facility]  [address] [facility]  [address]               Ney Quintanilla   3/24/2017 11:43 PM   ED    Description:  Male : 1985   Department:  [facility]            Your Care was Coordinated By:     Provider Role From To    Meghan Goodwin MD Attending Provider 17 1557 --      Reason for Visit     Abdominal Pain           Diagnoses this Visit        Comments    RLQ abdominal pain    -  Primary     Colitis           ED Disposition     None           To Do List           Follow-up Information     Follow up with [facility] -  In 3 days.    Specialty:  Emergency Medicine    Why:  If symptoms worsen    Contact information:    [address]  [facility] [id number] [phone]       These Medications        Disp Refills Start End    ciprofloxacin HCl (CIPRO) 500 MG tablet 20 tablet 0 3/25/2017 2017    Take 1 tablet (500 mg total) by mouth 2 (two) times daily. - Oral    metronidazole (FLAGYL) 500 MG tablet 21 tablet 0 3/25/2017 2017    Take 1 tablet (500 mg total) by mouth 3 (three) times daily. - Oral    hyoscyamine (ANASPAZ,LEVSIN) 0.125 mg Tab 30 tablet 0 3/25/2017 2017    Take 1 tablet (125 mcg total) by mouth every 6 (six) hours as needed. - Oral      [facility]     [facility] -  Assistance  Registered nurses in the [facility] provide clinical advisement, health education, appointment booking, and other advisory services.  Call for this free service at [phone].             Medications           Message regarding Medications     Verify the changes and/or additions to your medication regime listed below are the same as discussed with your clinician today.  If any of these changes or additions are incorrect, please notify your healthcare provider.        START taking these NEW medications        Refills    ciprofloxacin HCl (CIPRO) 500 MG tablet 0    Sig: Take 1 tablet (500 mg total) by mouth 2 (two) times daily.     Class: Print    Route: Oral    metronidazole (FLAGYL) 500 MG tablet 0    Sig: Take 1 tablet (500 mg total) by mouth 3 (three) times daily.    Class: Print    Route: Oral    hyoscyamine (ANASPAZ,LEVSIN) 0.125 mg Tab 0    Sig: Take 1 tablet (125 mcg total) by mouth every 6 (six) hours as needed.    Class: Print    Route: Oral      These medications were administered today        Dose Freq    lactated ringers bolus 1,000 mL 1,000 mL ED 1 Time    Starting on: 3/25/2017    Sig: Inject 1,000 mLs into the vein ED 1 Time.    Class: Normal    Route: Intravenous    hydromorphone (PF) injection 2 mg 2 mg ED 1 Time    Sig: Inject 1 mL (2 mg total) into the vein ED 1 Time.    Class: Normal    Route: Intravenous    ondansetron disintegrating tablet 8 mg 8 mg ED 1 Time    Sig: Take 1 tablet (8 mg total) by mouth ED 1 Time.    Class: Normal    Route: Oral    omnipaque 350 iohexol 75 mL 75 mL IMG once as needed    Sig: Inject 75 mLs into the vein ONCE PRN for contrast.    Class: Normal    Route: Intravenous           Verify that the below list of medications is an accurate representation of the medications you are currently taking.  If none reported, the list may be blank. If incorrect, please contact your healthcare provider. Carry this list with you in case of emergency.           Current Medications     alprazolam (XANAX) 2 MG Tab Take by mouth 2 (two) times daily as needed.    clonazePAM (KLONOPIN) 1 MG tablet Take 1 tablet (1 mg total) by mouth 3 (three) times daily as needed for Anxiety.    CLONIDINE HCL ORAL Take 0.2 mg by mouth 2 (two) times daily.     hydrochlorothiazide (HYDRODIURIL) 12.5 MG Tab Take 2 tablets (25 mg total) by mouth once daily.    hydrocodone-acetaminophen 5-325mg (NORCO) 5-325 mg per tablet Take 1 tablet by mouth every 4 (four) hours as needed.    ondansetron (ZOFRAN) 4 MG tablet Take 1 tablet (4 mg total) by mouth every 8 (eight) hours as needed.    ciprofloxacin HCl (CIPRO) 500 MG tablet Take 1  tablet (500 mg total) by mouth 2 (two) times daily.    hyoscyamine (ANASPAZ,LEVSIN) 0.125 mg Tab Take 1 tablet (125 mcg total) by mouth every 6 (six) hours as needed.    metronidazole (FLAGYL) 500 MG tablet Take 1 tablet (500 mg total) by mouth 3 (three) times daily.           Clinical Reference Information           Your Vitals Were     BP Pulse Temp Resp Height Weight    132/78 81 97.9 °F (36.6 °C) (Oral) 22 6' (1.829 m) 81.6 kg (180 lb)    SpO2 BMI                98% 24.41 kg/m2          Allergies as of 3/25/2017        Reactions    Flexeril [Cyclobenzaprine]     Ibuprofen     Toradol [Ketorolac] Itching    Tramadol       Immunizations Administered on Date of Encounter - 3/25/2017     None      ED Micro, Lab, POCT     Start Ordered       Status Ordering Provider    03/24/17 2347 03/24/17 2347  CBC W/ AUTO DIFFERENTIAL  STAT      Final result     03/24/17 2347 03/24/17 2347  Comp. Metabolic Panel  STAT      Final result     03/24/17 2347 03/24/17 2347  Lipase  Once      Final result     03/24/17 2347 03/24/17 2347  Urinalysis - Clean Catch  STAT      Final result       ED Imaging Orders     Start Ordered       Status Ordering Provider    03/24/17 2347 03/24/17 2347  CT Abdomen Pelvis With Contrast  1 time imaging      In process         Discharge Instructions         Dolor Abdominal    El dolor abdominal es dolor en el vientre o en la jennifer del intestino. Todo el jeanan tiene chase tipo de dolor de vez en cuando. En muchos casos el dolor desaparece por sí solo. Yamilka en ciertas ocasiones el dolor abdominal puede ser consecuencia de un problema grave, joanna por ejemplo christina apendicitis. Por lo tanto, es importante saber cuándo se debe obtener ayuda.  Causas del dolor abdominal  El dolor abdominal puede tener muchas causas. Entre las causas más comunes, en los adultos, se encuentran las siguientes:   · Estreñimiento, diarrea o gases  · Reflujo gastroesofágico (desplazamiento del ácido estomacal hacia el esófago, también  llamado reflujo ácido o ardor estomacal)  · Úlcera péptica (lesión en el revestimiento interno del estómago o del intestino tinsley)  · Inflamación de la vesícula biliar, el hígado o el páncreas  · Cálculos biliares o renales  · Apendicitis  · Obstrucción del intestino  · Hernia (protrusión o abultamiento de un órgano interno a través de un músculo u otro tejido)  · Infecciones de las vías urinarias  · En las mujeres, cólicos menstruales, fibromas o endometriosis del útero  · Inflamación o infección del intestino  Diagnóstico de la causa del dolor abdominal  [place] proveedor de atención médica le hará un examen para ayudar a determinar la causa de [place] dolor. En dada necesario, le harán ciertas pruebas. El dolor abdominal puede ser difícil de diagnosticar porque honey causas pueden ser muy diversas. Los detalles que usted sepa dileep acerca de [place] dolor pueden resultar útiles. Diga a [place] proveedor de atención médica dónde y cuándo siente el dolor y qué cosas lo alivian o lo empeoran. Mencione también si tiene otros síntomas joanna fiebre, cansancio, náuseas, vómito o cambios en la frecuencia con que evacúa honye intestinos o [place] vejiga.  Tratamiento del dolor abdominal  Ciertas causas de chase dolor, joanna christina apendicitis o christina obstrucción intestinal, requieren tratamiento urgente. Otros problemas pueden tratarse mediante descanso, consumo de líquidos o medicamentos. [place] proveedor de atención médica le dará instrucciones específicas para el tratamiento que debe seguir o cómo debe cuidarse según la causa de [place] dolor.   Si tiene vómito o diarrea, simon pequeños sorbos de agua u otros líquidos nida. Cuando esté listo para comer de nuevo alimentos sólidos, empiece comiendo pequeñas cantidades de cosas fáciles de digerir, joanna puré de manzanas, pan raphael o galletas saladas.   Cuándo debe llamar al médico  Llame al 911 o vaya al hospital de inmediato si tiene alguno de los siguientes síntomas:  · No puede defecar y está  vomitando  · Está vomitando juan jose o tiene diarrea de color negruzco o muy oscuro  · Tiene también dolor en el pecho, en el paul o en el hombro  · Siente que está a punto de desmayarse  · Tiene dolor en los omóplatos, con náuseas  · Tiene un dolor repentino e insoportable en el abdomen  · Tiene un nuevo dolor distinto de todos los ab que ha tenido antes  · Tiene el vientre rígido, rajani y sensible al tacto  Llame a [place] médico si tiene:  · Dolor alison más de 5 días  · Abotargamiento (sensación de llenura e inflamiento) alison más de 2 días  · Diarrea alison más de 5 días  · Fiebre superior a 101°F o más  · Dolor que continúa aumentando  · Pérdida de peso sin motivo aparente  · Falta de apetito persistente  · Juan Jose en las heces  Cómo prevenir el dolor abdominal  Estos son algunos consejos para ayudar a prevenir el dolor abdominal:  · Coma cantidades más pequeñas de alimentos en cada comida.  · Evite los alimentos fritos o que contengan mucha grasa.  · Evite los alimentos que le producen gas.  · Mohsen ejercicio con regularidad.  · Genie abundantes líquidos.  Para ayudar a prevenir los síntomas del reflujo gastroesofágico:  · Deje de fumar.  · Genie menos alcohol y coma menos de esos alimentos que aumentan [place] acidez estomacal.  · Pierda el exceso de peso.  · Termine de comer joanna mínimo 2 horas antes de acostarse.  · Eleve un poco la cabecera de [place] cama.  Date Last Reviewed: 3/30/2014  © 3894-4207 [employer]. [address] [place] [address], [place] [address]. Todos los derechos reservados. Esta información no pretende sustituir la atención médica profesional. Sólo [place] médico puede diagnosticar y tratar un problema de shruthi.          Discharge References/Attachments     GASTROENTERITIS, BACTERIAL (ADULT) (Malawian)      [facility] Sign-Up     Activating your [facility] account is as easy as 1-2-3!     1) Visit [email or web address], select Sign Up Now, enter this activation code and your date of birth, then select  Next.  [id number]  Expires: 5/9/2017  1:38 AM      2) Create a username and password to use when you visit [facility] in the future and select a security question in case you lose your password and select Next.    3) Enter your e-mail address and click Sign Up!    Additional Information  If you have questions, please e-mail [email or web address] or call [phone] to talk to our [facility] staff. Remember, [facility] is NOT to be used for urgent needs. For medical emergencies, dial 911.         Smoking Cessation     If you would like to quit smoking:   You may be eligible for free services if you are a [place] resident and started smoking cigarettes before September 1, 1988.  Call the Smoking Cessation Trust (SCT) toll free at ([phone] 408-8686 or ([phone] 681-5290.   Call 0-944-QUIT-NOW if you do not meet the above criteria.             [facility] complies with applicable Federal civil rights laws and does not discriminate on the basis of race, color, national origin, age, disability, or sex.        Language Assistance Services     ATTENTION: Language assistance services are available, free of charge. Please call [phone].      ATENCIÓN: Si habla haimlolita, tiene a [place] disposición servicios gratuitos de asistencia lingüística. Llame al [phone].     CHÚ Ý: N?u b?n nói Ti?ng Vi?t, có các d?ch v? h? tr? ngôn ng? mi?n phí dành cho b?n. G?i s? [phone].

## 2017-03-25 VITALS
TEMPERATURE: 98 F | OXYGEN SATURATION: 98 % | HEART RATE: 81 BPM | HEIGHT: 72 IN | BODY MASS INDEX: 24.38 KG/M2 | RESPIRATION RATE: 22 BRPM | DIASTOLIC BLOOD PRESSURE: 78 MMHG | WEIGHT: 180 LBS | SYSTOLIC BLOOD PRESSURE: 132 MMHG

## 2017-03-25 LAB
ALBUMIN SERPL BCP-MCNC: 4.2 G/DL
ALP SERPL-CCNC: 78 U/L
ALT SERPL W/O P-5'-P-CCNC: 44 U/L
ANION GAP SERPL CALC-SCNC: 9 MMOL/L
AST SERPL-CCNC: 27 U/L
BASOPHILS # BLD AUTO: 0.03 K/UL
BASOPHILS NFR BLD: 0.4 %
BILIRUB SERPL-MCNC: 0.4 MG/DL
BILIRUB UR QL STRIP: NEGATIVE
BUN SERPL-MCNC: 11 MG/DL
CALCIUM SERPL-MCNC: 9.2 MG/DL
CHLORIDE SERPL-SCNC: 101 MMOL/L
CLARITY UR: CLEAR
CO2 SERPL-SCNC: 28 MMOL/L
COLOR UR: YELLOW
CREAT SERPL-MCNC: 1.2 MG/DL
DIFFERENTIAL METHOD: NORMAL
EOSINOPHIL # BLD AUTO: 0.2 K/UL
EOSINOPHIL NFR BLD: 2.4 %
ERYTHROCYTE [DISTWIDTH] IN BLOOD BY AUTOMATED COUNT: 12.8 %
EST. GFR  (AFRICAN AMERICAN): >60 ML/MIN/1.73 M^2
EST. GFR  (NON AFRICAN AMERICAN): >60 ML/MIN/1.73 M^2
GLUCOSE SERPL-MCNC: 92 MG/DL
GLUCOSE UR QL STRIP: NEGATIVE
HCT VFR BLD AUTO: 43 %
HGB BLD-MCNC: 15 G/DL
HGB UR QL STRIP: NEGATIVE
KETONES UR QL STRIP: NEGATIVE
LEUKOCYTE ESTERASE UR QL STRIP: NEGATIVE
LIPASE SERPL-CCNC: 18 U/L
LYMPHOCYTES # BLD AUTO: 3.4 K/UL
LYMPHOCYTES NFR BLD: 40 %
MCH RBC QN AUTO: 29.6 PG
MCHC RBC AUTO-ENTMCNC: 34.9 %
MCV RBC AUTO: 85 FL
MONOCYTES # BLD AUTO: 0.7 K/UL
MONOCYTES NFR BLD: 8.5 %
NEUTROPHILS # BLD AUTO: 4.1 K/UL
NEUTROPHILS NFR BLD: 48.7 %
NITRITE UR QL STRIP: NEGATIVE
PH UR STRIP: 6 [PH] (ref 5–8)
PLATELET # BLD AUTO: 245 K/UL
PMV BLD AUTO: 9.8 FL
POTASSIUM SERPL-SCNC: 4 MMOL/L
PROT SERPL-MCNC: 7.3 G/DL
PROT UR QL STRIP: NEGATIVE
RBC # BLD AUTO: 5.07 M/UL
SODIUM SERPL-SCNC: 138 MMOL/L
SP GR UR STRIP: <=1.005 (ref 1–1.03)
URN SPEC COLLECT METH UR: ABNORMAL
UROBILINOGEN UR STRIP-ACNC: NEGATIVE EU/DL
WBC # BLD AUTO: 8.37 K/UL

## 2017-03-25 PROCEDURE — 80053 COMPREHEN METABOLIC PANEL: CPT

## 2017-03-25 PROCEDURE — 63600175 PHARM REV CODE 636 W HCPCS: Performed by: INTERNAL MEDICINE

## 2017-03-25 PROCEDURE — 85025 COMPLETE CBC W/AUTO DIFF WBC: CPT

## 2017-03-25 PROCEDURE — 25500020 PHARM REV CODE 255: Performed by: INTERNAL MEDICINE

## 2017-03-25 PROCEDURE — 25000003 PHARM REV CODE 250: Performed by: INTERNAL MEDICINE

## 2017-03-25 PROCEDURE — 83690 ASSAY OF LIPASE: CPT

## 2017-03-25 PROCEDURE — 81003 URINALYSIS AUTO W/O SCOPE: CPT

## 2017-03-25 RX ORDER — METRONIDAZOLE 500 MG/1
500 TABLET ORAL 3 TIMES DAILY
Qty: 21 TABLET | Refills: 0 | Status: SHIPPED | OUTPATIENT
Start: 2017-03-25 | End: 2017-04-01

## 2017-03-25 RX ORDER — HYOSCYAMINE SULFATE 0.125 MG
125 TABLET ORAL EVERY 6 HOURS PRN
Qty: 30 TABLET | Refills: 0 | Status: SHIPPED | OUTPATIENT
Start: 2017-03-25 | End: 2017-04-24

## 2017-03-25 RX ORDER — CIPROFLOXACIN 500 MG/1
500 TABLET ORAL 2 TIMES DAILY
Qty: 20 TABLET | Refills: 0 | Status: SHIPPED | OUTPATIENT
Start: 2017-03-25 | End: 2017-04-04

## 2017-03-25 RX ORDER — HYDROMORPHONE HYDROCHLORIDE 2 MG/ML
2 INJECTION, SOLUTION INTRAMUSCULAR; INTRAVENOUS; SUBCUTANEOUS
Status: COMPLETED | OUTPATIENT
Start: 2017-03-25 | End: 2017-03-25

## 2017-03-25 RX ORDER — ONDANSETRON 8 MG/1
8 TABLET, ORALLY DISINTEGRATING ORAL
Status: COMPLETED | OUTPATIENT
Start: 2017-03-25 | End: 2017-03-25

## 2017-03-25 RX ADMIN — IOHEXOL 75 ML: 350 INJECTION, SOLUTION INTRAVENOUS at 12:03

## 2017-03-25 RX ADMIN — ONDANSETRON 8 MG: 8 TABLET, ORALLY DISINTEGRATING ORAL at 12:03

## 2017-03-25 RX ADMIN — HYDROMORPHONE HYDROCHLORIDE 2 MG: 2 INJECTION, SOLUTION INTRAMUSCULAR; INTRAVENOUS; SUBCUTANEOUS at 12:03

## 2017-03-25 RX ADMIN — SODIUM CHLORIDE, SODIUM LACTATE, POTASSIUM CHLORIDE, AND CALCIUM CHLORIDE 1000 ML: .6; .31; .03; .02 INJECTION, SOLUTION INTRAVENOUS at 12:03

## 2017-03-25 NOTE — ED NOTES
"Called to room for pt shivering. Pt found to be trembling and tearful. Pt's wife states "he's shaking and his heart rate and BP are steadily going up." Pt's temperature taken and pt found to be afebrile at 97.9. Dr. Goodwin to bs. Discussed potential side effects of pain med administration and possible anxiety. Pt and wife verbalized understanding. Pt remains on cardiac monitor with vss noted. Bed low and locked, call light in reach, side rails up x2. Pt and family verbalized understanding of status and POC; Pt provided with additional blankets per request and denies further needs. Will continue to monitor.  "

## 2017-03-25 NOTE — DISCHARGE INSTRUCTIONS
Dolor Abdominal    El dolor abdominal es dolor en el vientre o en la jennifer del intestino. Todo el jeanna tiene chase tipo de dolor de vez en cuando. En muchos casos el dolor desaparece por sí solo. Yamilka en ciertas ocasiones el dolor abdominal puede ser consecuencia de un problema grave, joanna por ejemplo christina apendicitis. Por lo tanto, es importante saber cuándo se debe obtener ayuda.  Causas del dolor abdominal  El dolor abdominal puede tener muchas causas. Entre las causas más comunes, en los adultos, se encuentran las siguientes:   · Estreñimiento, diarrea o gases  · Reflujo gastroesofágico (desplazamiento del ácido estomacal hacia el esófago, también llamado reflujo ácido o ardor estomacal)  · Úlcera péptica (lesión en el revestimiento interno del estómago o del intestino tinsley)  · Inflamación de la vesícula biliar, el hígado o el páncreas  · Cálculos biliares o renales  · Apendicitis  · Obstrucción del intestino  · Hernia (protrusión o abultamiento de un órgano interno a través de un músculo u otro tejido)  · Infecciones de las vías urinarias  · En las mujeres, cólicos menstruales, fibromas o endometriosis del útero  · Inflamación o infección del intestino  Diagnóstico de la causa del dolor abdominal  [place] proveedor de atención médica le hará un examen para ayudar a determinar la causa de [place] dolor. En dada necesario, le harán ciertas pruebas. El dolor abdominal puede ser difícil de diagnosticar porque honey causas pueden ser muy diversas. Los detalles que usted sepa dileep acerca de [place] dolor pueden resultar útiles. Diga a [place] proveedor de atención médica dónde y cuándo siente el dolor y qué cosas lo alivian o lo empeoran. Mencione también si tiene otros síntomas joanna fiebre, cansancio, náuseas, vómito o cambios en la frecuencia con que evacúa honey intestinos o [place] vejiga.  Tratamiento del dolor abdominal  Ciertas causas de chase dolor, joanna christina apendicitis o christina obstrucción intestinal, requieren tratamiento  urgente. Otros problemas pueden tratarse mediante descanso, consumo de líquidos o medicamentos. [place] proveedor de atención médica le dará instrucciones específicas para el tratamiento que debe seguir o cómo debe cuidarse según la causa de [place] dolor.   Si tiene vómito o diarrea, simon pequeños sorbos de agua u otros líquidos nida. Cuando esté listo para comer de nuevo alimentos sólidos, empiece comiendo pequeñas cantidades de cosas fáciles de digerir, joanna puré de manzanas, pan raphael o galletas saladas.   Cuándo debe llamar al médico  Llame al 911 o vaya al [facility] de inmediato si tiene alguno de los siguientes síntomas:  · No puede defecar y está vomitando  · Está vomitando juan jose o tiene diarrea de color negruzco o muy oscuro  · Tiene también dolor en el pecho, en el paul o en el hombro  · Siente que está a punto de desmayarse  · Tiene dolor en los omóplatos, con náuseas  · Tiene un dolor repentino e insoportable en el abdomen  · Tiene un nuevo dolor distinto de todos los ab que ha tenido antes  · Tiene el vientre rígido, rajani y sensible al tacto  Llame a [place] médico si tiene:  · Dolor alison más de 5 días  · Abotargamiento (sensación de llenura e inflamiento) alison más de 2 días  · Diarrea alison más de 5 días  · Fiebre superior a 101°F o más  · Dolor que continúa aumentando  · Pérdida de peso sin motivo aparente  · Falta de apetito persistente  · Juan Jose en las heces  Cómo prevenir el dolor abdominal  Estos son algunos consejos para ayudar a prevenir el dolor abdominal:  · Coma cantidades más pequeñas de alimentos en cada comida.  · Evite los alimentos fritos o que contengan mucha grasa.  · Evite los alimentos que le producen gas.  · Mohsen ejercicio con regularidad.  · Simon abundantes líquidos.  Para ayudar a prevenir los síntomas del reflujo gastroesofágico:  · Deje de fumar.  · Simon menos alcohol y coma menos de esos alimentos que aumentan [place] acidez estomacal.  · Pierda el exceso de peso.  · Termine de  comer joanna mínimo 2 horas antes de acostarse.  · Eleve un poco la cabecera de [place] cama.  Date Last Reviewed: 3/30/2014  © 2714-5002 [employer] [address] [place] [address], [place] [address]. Todos los derechos reservados. Esta información no pretende sustituir la atención médica profesional. Sólo [place] médico puede diagnosticar y tratar un problema de shruthi.

## 2017-03-25 NOTE — ED PROVIDER NOTES
SCRIBE #1 NOTE: I, Justo Quesada, am scribing for, and in the presence of, Meghan Goodwin MD. I have scribed the entire note.      History      Chief Complaint   Patient presents with    Abdominal Pain     reports n/v/d x 2 days       Review of patient's allergies indicates:   Allergen Reactions    Flexeril [cyclobenzaprine]     Ibuprofen     Toradol [ketorolac] Itching    Tramadol         HPI   HPI    3/24/2017, 11:39 PM   History obtained from the patient      History of Present Illness: Ney Quintanilla is a 31 y.o. male patient who presents to the Emergency Department for periumbilical abdominal pain which onset 2-3 days ago. The pain is constant and moderate in severity. No modifying factors or radiation of pain reported. Associated sxs include N/V/D and decreased appetite. Patient denies any headache, dizziness, light-headedness, CP, SOB, bloody stool or any other sx at this time. The pt still has his appendix. No further complaints or concerns at this time.       Arrival mode: Personal vehicle    PCP: Primary Doctor No       Past Medical History:  Past Medical History:   Diagnosis Date    Anxiety     GERD (gastroesophageal reflux disease)     Hypertension        Past Surgical History:  History reviewed. No pertinent surgical history.      Family History:  Family History   Problem Relation Age of Onset    Hypertension Mother     Diabetes Mother     Heart disease Father     Hypertension Father     Diabetes Father        Social History:  Social History     Social History Main Topics    Smoking status: Current Every Day Smoker     Packs/day: 0.50     Types: Cigarettes    Smokeless tobacco: Never Used    Alcohol use Yes    Drug use: Yes     Special: Cocaine    Sexual activity: Yes       ROS   Review of Systems   Constitutional: Positive for appetite change (decreased). Negative for chills and fever.   HENT: Negative for sore throat.    Respiratory: Negative for shortness of breath.     Cardiovascular: Negative for chest pain.   Gastrointestinal: Positive for abdominal pain, diarrhea, nausea and vomiting.   Genitourinary: Negative for dysuria.   Musculoskeletal: Negative for back pain.   Skin: Negative for rash.   Neurological: Negative for dizziness, weakness, light-headedness and headaches.   Hematological: Does not bruise/bleed easily.       Physical Exam    Initial Vitals   BP Pulse Resp Temp SpO2   03/24/17 2229 03/24/17 2229 03/24/17 2229 03/24/17 2229 03/24/17 2229   137/90 76 19 98.1 °F (36.7 °C) 96 %      Physical Exam  Nursing Notes and Vital Signs Reviewed.  Constitutional: Patient is in mild distress. Awake and alert. Well-developed and well-nourished.  Head: Atraumatic. Normocephalic.  Eyes: PERRL. EOM intact. Conjunctivae are not pale. No scleral icterus.  ENT: Mucous membranes are moist. Oropharynx is clear and symmetric.    Neck: Supple. Full ROM. No lymphadenopathy.  Cardiovascular: Regular rate. Regular rhythm. No murmurs, rubs, or gallops. Distal pulses are 2+ and symmetric.  Pulmonary/Chest: No respiratory distress. Clear to auscultation bilaterally. No wheezing, rales, or rhonchi.  Abdominal: Soft and non-distended.  There is RLQ tenderness.  No rebound, guarding, or rigidity. Good bowel sounds.  Genitourinary: No CVA tenderness  Musculoskeletal: Moves all extremities. No obvious deformities. No edema. No calf tenderness.  Skin: Warm and dry.  Neurological:  Alert, awake, and appropriate.  Normal speech.  No acute focal neurological deficits are appreciated.  Psychiatric: Normal affect. Good eye contact. Appropriate in content.    ED Course    Procedures  ED Vital Signs:  Vitals:    03/24/17 2229 03/25/17 0010 03/25/17 0027 03/25/17 0028   BP: (!) 137/90      Pulse: 76 77 82 82   Resp: 19  20    Temp: 98.1 °F (36.7 °C)      TempSrc: Oral      SpO2: 96%  99%    Weight: 81.6 kg (180 lb)      Height: 6' (1.829 m)       03/25/17 0108 03/25/17 0115 03/25/17 0123   BP: (!) 145/87   132/78   Pulse: 95  81   Resp: 20  (!) 22   Temp:  97.9 °F (36.6 °C)    TempSrc:  Oral    SpO2: 98%  98%   Weight:      Height:          Abnormal Lab Results:  Labs Reviewed   URINALYSIS - Abnormal; Notable for the following:        Result Value    Specific Gravity, UA <=1.005 (*)     All other components within normal limits   CBC W/ AUTO DIFFERENTIAL   COMPREHENSIVE METABOLIC PANEL   LIPASE        All Lab Results:  Results for orders placed or performed during the hospital encounter of 03/24/17   CBC W/ AUTO DIFFERENTIAL   Result Value Ref Range    WBC 8.37 3.90 - 12.70 K/uL    RBC 5.07 4.60 - 6.20 M/uL    Hemoglobin 15.0 14.0 - 18.0 g/dL    Hematocrit 43.0 40.0 - 54.0 %    MCV 85 82 - 98 fL    MCH 29.6 27.0 - 31.0 pg    MCHC 34.9 32.0 - 36.0 %    RDW 12.8 11.5 - 14.5 %    Platelets 245 150 - 350 K/uL    MPV 9.8 9.2 - 12.9 fL    Gran # 4.1 1.8 - 7.7 K/uL    Lymph # 3.4 1.0 - 4.8 K/uL    Mono # 0.7 0.3 - 1.0 K/uL    Eos # 0.2 0.0 - 0.5 K/uL    Baso # 0.03 0.00 - 0.20 K/uL    Gran% 48.7 38.0 - 73.0 %    Lymph% 40.0 18.0 - 48.0 %    Mono% 8.5 4.0 - 15.0 %    Eosinophil% 2.4 0.0 - 8.0 %    Basophil% 0.4 0.0 - 1.9 %    Differential Method Automated    Comp. Metabolic Panel   Result Value Ref Range    Sodium 138 136 - 145 mmol/L    Potassium 4.0 3.5 - 5.1 mmol/L    Chloride 101 95 - 110 mmol/L    CO2 28 23 - 29 mmol/L    Glucose 92 70 - 110 mg/dL    BUN, Bld 11 6 - 20 mg/dL    Creatinine 1.2 0.5 - 1.4 mg/dL    Calcium 9.2 8.7 - 10.5 mg/dL    Total Protein 7.3 6.0 - 8.4 g/dL    Albumin 4.2 3.5 - 5.2 g/dL    Total Bilirubin 0.4 0.1 - 1.0 mg/dL    Alkaline Phosphatase 78 55 - 135 U/L    AST 27 10 - 40 U/L    ALT 44 10 - 44 U/L    Anion Gap 9 8 - 16 mmol/L    eGFR if African American >60 >60 mL/min/1.73 m^2    eGFR if non African American >60 >60 mL/min/1.73 m^2   Lipase   Result Value Ref Range    Lipase 18 4 - 60 U/L   Urinalysis - Clean Catch   Result Value Ref Range    Specimen UA Urine, Clean Catch     Color, UA Yellow  Yellow, Straw, Mariely    Appearance, UA Clear Clear    pH, UA 6.0 5.0 - 8.0    Specific Gravity, UA <=1.005 (A) 1.005 - 1.030    Protein, UA Negative Negative    Glucose, UA Negative Negative    Ketones, UA Negative Negative    Bilirubin (UA) Negative Negative    Occult Blood UA Negative Negative    Nitrite, UA Negative Negative    Urobilinogen, UA Negative <2.0 EU/dL    Leukocytes, UA Negative Negative         Imaging Results:  Imaging Results         CT Abdomen Pelvis With Contrast (Preliminary result) Nonemergent findings as described without any acute abdominopelvic abnormality.              The Emergency Provider reviewed the vital signs and test results, which are outlined above.    ED Discussion     1:37 AM: Discussed with pt all pertinent ED information and results. Discussed pt dx and plan of tx. The pt will be started on both Cipro and Flagyl at this time. Gave pt all f/u and return to the ED instructions. All questions and concerns were addressed at this time. Pt expresses understanding of information and instructions, and is comfortable with plan to discharge. Pt is stable for discharge.    I discussed with patient and/or family/caretaker that evaluation in the ED does not suggest any emergent or life threatening medical conditions requiring immediate intervention beyond what was provided in the ED, and I believe patient is safe for discharge.  Regardless, an unremarkable evaluation in the ED does not preclude the development or presence of a serious of life threatening condition. As such, patient was instructed to return immediately for any worsening or change in current symptoms.    ED Medication(s):  Medications   lactated ringers bolus 1,000 mL (0 mLs Intravenous Stopped 3/25/17 0054)   hydromorphone (PF) injection 2 mg (2 mg Intravenous Given 3/25/17 0016)   ondansetron disintegrating tablet 8 mg (8 mg Oral Given 3/25/17 0017)   omnipaque 350 iohexol 75 mL (75 mLs Intravenous Given 3/25/17 0059)      New Prescriptions    CIPROFLOXACIN HCL (CIPRO) 500 MG TABLET    Take 1 tablet (500 mg total) by mouth 2 (two) times daily.    HYOSCYAMINE (ANASPAZ,LEVSIN) 0.125 MG TAB    Take 1 tablet (125 mcg total) by mouth every 6 (six) hours as needed.    METRONIDAZOLE (FLAGYL) 500 MG TABLET    Take 1 tablet (500 mg total) by mouth 3 (three) times daily.     Medical Decision Making    Medical Decision Making:   Clinical Tests:   Lab Tests: Ordered and Reviewed  The following lab test(s) were unremarkable: CBC, CMP and Lipase  Radiological Study: Ordered and Reviewed           Scribe Attestation:   Scribe #1: I performed the above scribed service and the documentation accurately describes the services I performed. I attest to the accuracy of the note.    Attending:   Physician Attestation Statement for Scribe #1: I, Meghan Goodwin MD, personally performed the services described in this documentation, as scribed by Justo Quesada, in my presence, and it is both accurate and complete.          Clinical Impression       ICD-10-CM ICD-9-CM   1. RLQ abdominal pain R10.31 789.03   2. Colitis K52.9 558.9     Patient was given IV fluids, symptomatic treatment with Dilaudid and Zofran.  CT scan finding discussed with the family and the patient.  Patient was discharged home on Cipro and Flagyl along with levsin for symptomatically relief.  Patient was instructed to come back if the patient's symptoms got worse.        Disposition:   Disposition: Discharged  Condition: Stable         Meghan Goodwin MD  03/25/17 0142

## 2017-04-14 ENCOUNTER — HOSPITAL ENCOUNTER (EMERGENCY)
Facility: HOSPITAL | Age: 32
Discharge: HOME | End: 2017-04-14

## 2017-04-14 VITALS
HEART RATE: 85 BPM | HEIGHT: 74 IN | DIASTOLIC BLOOD PRESSURE: 84 MMHG | TEMPERATURE: 98 F | SYSTOLIC BLOOD PRESSURE: 157 MMHG | OXYGEN SATURATION: 96 % | BODY MASS INDEX: 24.38 KG/M2 | RESPIRATION RATE: 16 BRPM | WEIGHT: 190 LBS

## 2017-04-14 DIAGNOSIS — M72.2 PLANTAR FASCIITIS OF LEFT FOOT: Primary | ICD-10-CM

## 2017-04-14 DIAGNOSIS — R30.0 DYSURIA: ICD-10-CM

## 2017-04-14 LAB
BILIRUB UR QL STRIP: NEGATIVE
CLARITY UR: CLEAR
COLOR UR: YELLOW
GLUCOSE UR QL STRIP: NEGATIVE
HGB UR QL STRIP: NEGATIVE
KETONES UR QL STRIP: NEGATIVE
LEUKOCYTE ESTERASE UR QL STRIP: NEGATIVE
NITRITE UR QL STRIP: NEGATIVE
PH UR STRIP: 6 [PH] (ref 5–8)
PROT UR QL STRIP: NEGATIVE
SP GR UR STRIP: 1.01 (ref 1–1.03)
URN SPEC COLLECT METH UR: NORMAL
UROBILINOGEN UR STRIP-ACNC: NEGATIVE EU/DL

## 2017-04-14 PROCEDURE — 25000003 PHARM REV CODE 250: Performed by: NURSE PRACTITIONER

## 2017-04-14 PROCEDURE — 81003 URINALYSIS AUTO W/O SCOPE: CPT

## 2017-04-14 PROCEDURE — 99283 EMERGENCY DEPT VISIT LOW MDM: CPT

## 2017-04-14 RX ORDER — ACETAMINOPHEN 500 MG
1000 TABLET ORAL
Status: COMPLETED | OUTPATIENT
Start: 2017-04-14 | End: 2017-04-14

## 2017-04-14 RX ORDER — ACETAMINOPHEN 500 MG
500 TABLET ORAL EVERY 6 HOURS PRN
Qty: 60 TABLET | Refills: 0 | Status: SHIPPED | OUTPATIENT
Start: 2017-04-14 | End: 2017-04-25 | Stop reason: ALTCHOICE

## 2017-04-14 RX ADMIN — ACETAMINOPHEN 1000 MG: 500 TABLET ORAL at 02:04

## 2017-04-14 NOTE — DISCHARGE INSTRUCTIONS
"  Disuria (Adultos, Causa Incierta) [Dysuria, Adult, Uncertain Cause]    La "uretra" es el canal que permite a la orina salir del cuerpo. Disuria es la sensación de dolor o ardor en la uretra alison el orinado. En christina renee, la uretra es la abertura sobre la vagina. En los hombres, la uretra es la abertura sobre la punta del pene.  La disuria puede ser causada por algo que irrite o inflame la uretra, joanna christina infección o irritación química. La causa de [place] disuria no es cierta. La causa más común de disuria en los adultos es la infección de vejiga. Ésta se diagnostica con un análisis de orina y requiere tratamiento con antibióticos.  La irritación química puede ser causada por jabones, lociones, colonias, productos para la higiene femenina, gelatinas anticonceptivas, cremas y espumas. Se va 1-3 días después de dejar la exposición al producto químico.  La infección de la uretra por transmisión sexual (clamidia o gonorrea) puede causar disuria. Si [place] médico sospecha esto, le pueden hacer un cultivo del espécimen. Tomará unos dos días para saber el resultado. En las mujeres posmenopáusicas, la disuria puede venir joanna resultado de christina disminución de la cantidad de líquido lubricante producido por el cuerpo. La disuria se vuelve "crónica" cuando dura semanas o meses, o se va y retorna de nuevo. Para diagnosticar y tratar la disuria crónica puede ser necesaria la derivación a un especialista (urólogo).  Cuidado En [place]:  · Si le tomaron christina muestra de orina para cultivo puede llamar en dos días por el resultado.  · Si se hizo un cultivo para christina enfermedad de transmisión sexual, evite la actividad sexual hasta que [place] médico le diga que no hay ninguna infección. Puede llamar en dos días por los resultados.  · Si le diagnosticaron christina infección transmitida sexualmente:  ¨ [place] darrion sexual necesita ser tratada, aún si no tiene síntomas.  ¨ [place] darrion debe contactar a [place] médico o acudir al Departamento de [place] para " que le examinen y traten.  ¨ Evite la actividad sexual hasta que usted y [place] darrion hayan completado todo el medicamento y les digan que ya no son contagiosos.  · Evite cualquier agente químico que usted sospecha puede causar honey síntomas.  · Si le dieron un medicamento recetado, tómelo según le indicaron.  Seguimiento  con [place] médico joanna se le indicó, o si no empieza a mejorar después de leon días.  Busque Prontamente Atención Médica  si algo de lo siguiente ocurre:  · Fiebre de 100.4°F (38°C) o más kendall, o joanna le haya indicado [place] proveedor de atención médica  · Dolor creciente en la espalda o el abdomen  · Imposibilidad de orinar debido al dolor  · Christina nueva descarga desde la uretra, vagina o pene  · Articulaciones hinchadas o doloridas  · Sarpullido  Date Last Reviewed: 3/10/2014  © 4811-4423 [employer]. [address] [place] [address], [place] [address]. Todos los derechos reservados. Esta información no pretende sustituir la atención médica profesional. Sólo [place] médico puede diagnosticar y tratar un problema de shruthi.          Tratamiento de la fascitis plantar  En primer lugar, [place] proveedor de atención médica tratará de determinar la causa de [place] problema para sugerirle formas de aliviar el dolor. Si el dolor se debe a la violetta mecánica del pie, puede ayudar usar unas plantillas hechas a la medida (órtesis).  Reduzca los síntomas    Las siguientes son algunas sugerencias:  · Para aliviar síntomas leves, pruebe tomando aspirina, ibuprofeno u otro medicamento según le indiquen. También podría ayudar frotarse hielo en la jennifer afectada.  · Para reducir un dolor e hinchazón patricio, [place] proveedor de atención médica puede recetarle pastillas, inyecciones o christina férula para caminar en algunos casos. También podrían recomendarle un tipo de fisioterapia, joanna el ultrasonido o un plan de ejercicios diarios de estiramiento. Es raro que se necesite cirugía.  · Para reducir los síntomas producidos por christina violetta mecánica  del pie, jorgito vez le coloquen christina venda adhesiva para john apoyo al arco y controlar los movimientos por un tiempo. Las férulas nocturnas también pueden ayudar a estirar la fascia.  Control del movimiento  Si los vendajes surten efecto, [place] proveedor de atención médica podría recetarle unas órtesis. Elaboradas a partir de un molde de yeso de honey pies, estas órtesis controlan los movimientos del pie y deberían eliminar los síntomas.  Reducción del uso excesivo  Cada vez que [place] pie golpea el suelo, la fascia plantar se estira. Para reducir el esfuerzo de la fascia plantar y la posibilidad de usarla excesivamente, siga estos consejos:  · Adelgace lo necesario.  · Evite correr en terreno rajani o irregular.  · Use las órtesis todo el tiempo en honey zapatos o pantuflas.  Si se necesita cirugía  Si los demás tipos de tratamiento no logran controlar [place] dolor, [place] proveedor de atención médica podría considerar hacerle christina cirugía. Lynnette esta operación, se corta parcialmente la fascia plantar para liberar la tensión. En la etapa de cicatrización, el espacio entre el hueso del talón y la fascia plantar se rellena de tejido fibroso.   Date Last Reviewed: 10/14/2015  © 6631-0267 [employer]. [address] [place] [address], [place] [address]. Todos los derechos reservados. Esta información no pretende sustituir la atención médica profesional. Sólo [place] médico puede diagnosticar y tratar un problema de shruthi.          Fascitis Plantar [Plantar Fasciitis]  La fascitis plantar es christina inflamación dolorosa del tejido que cubre los huesos en la planta de [place] pie. Ésta se puede desarrollar gradualmente o repentinamente. Normalmente afecta a un pie por vez. El dolor de talón puede ser francoise y sentirse joanna un cuchillo clavándose en la planta de [place] pie. El dolor puede aparecer después de hacer ejercicio, correr distancias largas, subir escaleras, estar parado por mucho tiempo, o después de levantarse de christina posición sentado.  Los factores  de riesgo incluyen la artritis, diabetes, obesidad o aumento de peso reciente, pie plano, arco elevado, el uso de tacones altos o zapatos flojos o con un mal soporte de arco.  El dolor de pie derivado de esta condición normalmente es peor alison la mañana y mejora caminando. Al final del día puede arturo christina molestia moderada. El tratamiento requiere reposo a corto plazo y control de la inflamación. Puede christelle hasta nueve meses antes de que los síntomas desparezcan con las medidas descritas a continuación. Raramente, puede ser necesaria christina inyección de esteroides en el pie o cirugía.  Cuidado En [place]:  1. Si usted tiene sobrepeso, adelgace para promover la sanación. Reemplace los zapatos de hacer deporte cuando estén desgastados. No camine o corra descalzo.  2. Elija zapatos con un buen arco de soporte y absorción de los golpes.  3. Las plantillas especiales son christina parte importante del tratamiento. Éstas brindan un soporte óptimo para el arco. Aunque usted puede adquirir plantillas económicas de venta ross, las mejores son las que se elaboran a [place] medida por [place] podólogo (especialista de los pies).   4. Las tablillas o férulas nocturnas (suministradas por un podólogo) mantinenen el talón estirado mientras que duerme, previniendo así el dolor en la mañana.  5. Evite las actividades que esfuercen los pies joanna trotar, permanecer de pie o caminar por mucho tiempo, los deportes de contacto, etc.  6. Lo deirdre que debe hacer en la mañana y antes de hacer deportes, es estirar la planta de los pies. Flexione suavemente [place] tobillo de manera que el pie se mueva hacia la rodilla.  7. Aplicarse hielo puede ayudar a controlar el dolor en el talón. Aplíquese compresas de hielo (cubitos de hielo en christina bolsa plástica, envuelta en christina toalla) en el talón alison 10-20 minutos joanna christina medida preventiva o después de un ataque francoise de los síntomas. Puede repetir esto cada 1-2 horas según sea necesario.  8. Puede usar  acetaminofén (Tylenol) o ibuprofeno (Motrin, Advil) para controlar el dolor, a menos que le receten otro medicamento para el dolor. [NOTA: Si tiene christina enfermedad del hígado o los riñones, o si alguna vez ha tenido christina úlcera estomacal o sangrado gastrointestinal, hable con [place] médico antes de usar estos medicamentos.]  Seguimiento:  con [place] médico o de acuerdo a lo indicado por nuestro personal. Llame para pedir un turno si el dolor empeora o no hay alivio luego de unas semanas de tratamiento en casa. Podría necesitar plantillas, férulas nocturnas, o christina bota de yeso.  [NOTA: Si le hicieron radiografías, serán examinadas por un radiólogo. Le avisarán si hay algo nuevo que pueda afectar [place] atención]  Busque Prontamente Atención Médica Si Algo De Lo Siguiente Ocurre:  · Hinchazón del pie con enrojecimiento o dolor creciente  Date Last Reviewed: 11/21/2015  © 4201-6815 [employer]. [address] [place] [address]. Todos los derechos reservados. Esta información no pretende sustituir la atención médica profesional. Sólo [place] médico puede diagnosticar y tratar un problema de shruthi.

## 2017-04-14 NOTE — ED AVS SNAPSHOT
[facility]  [address] [place] [address]               Ney Quintanilla   2017  1:47 AM   ED    Descripción:  Male : 1985   Departamento:  [facility]           Fernandez Cuidado fue coordinado por:     Provider Role From To    Moshe Tatum NP Nurse Practitioner 17 0147 --      Razón de la arun     Leg Pain           Diagnósticos de Esta Visita        Comentarios    Plantar fasciitis of left foot    -  Primario     Dysuria           ED Disposition     Ninguna           Lista de tareas           Información de seguimiento     Realice un seguimiento con:  pcp or ER     Cómo:  Mohsen christina arun lo antes posible      Recetas para recoger        Disp Refills Start End    acetaminophen (TYLENOL) 500 MG tablet 60 tablet 0 2017     Take 1 tablet (500 mg total) by mouth every 6 (six) hours as needed for Pain. - Oral      [facility]     [facility] Enfermeras - Asistencia   Enfermeras registradas de [facility] pueden ayudarle a reservar christina arun, proveer educación para la shruthi, asesoría clínica, y otros servicios de asesoramiento.   Llame para chase servicio gratuito a [phone].             Medicamentos           Mensaje sobre Medicamentos     Verificar los cambios y / o adiciones a fernandez régimen de medicación son los mismos que discutir con fernandez médico. Si cualquiera de estos cambios o adiciones son incorrectos, por favor notifique a fernandez proveedor de atención médica.        EMPEZAR a christelle estos medicamentos NUEVOS        Refills    acetaminophen (TYLENOL) 500 MG tablet 0    Sig: Take 1 tablet (500 mg total) by mouth every 6 (six) hours as needed for Pain.    Categoría: Print    Vía: Oral      These medications were administered today        Dose Freq    acetaminophen tablet 1,000 mg 1,000 mg ED 1 Time    Sig: Take 2 tablets (1,000 mg total) by mouth ED 1 Time.    Categoría: Normal    Vía: Oral           Verifique que la  "siguiente lista de medicamentos es christina representación exacta de los medicamentos que está tomando actualmente. Si no hay ningunos reportados, la lista puede estar en nicole. Si no es correcta, por favor póngase en contacto con [place] proveedor de atención médica. Lleve esta lista con usted en dada de emergencia.           Medicamentos Actuales     alprazolam (XANAX) 2 MG Tab Take by mouth 2 (two) times daily as needed.    clonazePAM (KLONOPIN) 1 MG tablet Take 1 tablet (1 mg total) by mouth 3 (three) times daily as needed for Anxiety.    hydrochlorothiazide (HYDRODIURIL) 12.5 MG Tab Take 2 tablets (25 mg total) by mouth once daily.    acetaminophen (TYLENOL) 500 MG tablet Take 1 tablet (500 mg total) by mouth every 6 (six) hours as needed for Pain.    CLONIDINE HCL ORAL Take 0.2 mg by mouth 2 (two) times daily.     hydrocodone-acetaminophen 5-325mg (NORCO) 5-325 mg per tablet Take 1 tablet by mouth every 4 (four) hours as needed.    hyoscyamine (ANASPAZ,LEVSIN) 0.125 mg Tab Take 1 tablet (125 mcg total) by mouth every 6 (six) hours as needed.    ondansetron (ZOFRAN) 4 MG tablet Take 1 tablet (4 mg total) by mouth every 8 (eight) hours as needed.           Información de referencia clínica           Janet signos vitales ac     PS Pulso Temperatura Resp [place] Peso    157/84 (BP Location: Right arm, Patient Position: Sitting) 85 97.7 °F (36.5 °C) (Oral) 16 6' 2" (1.88 m) 86.2 kg (190 lb)    SpO2 BMI (IMC)                96% 24.39 kg/m2          Alergias     A partir del:  4/14/2017           Reacciones    Flexeril [Cyclobenzaprine]     Ibuprofen     Toradol [Ketorolac] Itching    Tramadol       Vacunas     Administradas en la fecha de la visita:  4/14/2017        None      ED Micro, Lab, POCT     Start Ordered       Status Ordering Provider    04/14/17 0152 04/14/17 0151  Urinalysis Clean Catch  STAT      Final result       ED Imaging Orders     None        Instrucciones a dileep de kendall         Disuria (Adultos, Causa " "Incierta) [Dysuria, Adult, Uncertain Cause]    La "uretra" es el canal que permite a la orina salir del cuerpo. Disuria es la sensación de dolor o ardor en la uretra alison el orinado. En christina renee, la uretra es la abertura sobre la vagina. En los hombres, la uretra es la abertura sobre la punta del pene.  La disuria puede ser causada por algo que irrite o inflame la uretra, joanna christina infección o irritación química. La causa de [place] disuria no es cierta. La causa más común de disuria en los adultos es la infección de vejiga. Ésta se diagnostica con un análisis de orina y requiere tratamiento con antibióticos.  La irritación química puede ser causada por jabones, lociones, colonias, productos para la higiene femenina, gelatinas anticonceptivas, cremas y espumas. Se va 1-3 días después de dejar la exposición al producto químico.  La infección de la uretra por transmisión sexual (clamidia o gonorrea) puede causar disuria. Si [place] médico sospecha esto, le pueden hacer un cultivo del espécimen. Tomará unos dos días para saber el resultado. En las mujeres posmenopáusicas, la disuria puede venir joanna resultado de christina disminución de la cantidad de líquido lubricante producido por el cuerpo. La disuria se vuelve "crónica" cuando dura semanas o meses, o se va y retorna de nuevo. Para diagnosticar y tratar la disuria crónica puede ser necesaria la derivación a un especialista (urólogo).  Cuidado En [place]:  · Si le tomaron christina muestra de orina para cultivo puede llamar en dos días por el resultado.  · Si se hizo un cultivo para christina enfermedad de transmisión sexual, evite la actividad sexual hasta que [place] médico le diga que no hay ninguna infección. Puede llamar en dos días por los resultados.  · Si le diagnosticaron christina infección transmitida sexualmente:  ¨ [place] darrion sexual necesita ser tratada, aún si no tiene síntomas.  ¨ [place] darrion debe contactar a [place] médico o acudir al Departamento de [place] Pública para que le examinen y " traten.  ¨ Evite la actividad sexual hasta que usted y [place] darrion hayan completado todo el medicamento y les digan que ya no son contagiosos.  · Evite cualquier agente químico que usted sospecha puede causar honey síntomas.  · Si le dieron un medicamento recetado, tómelo según le indicaron.  Seguimiento  con [place] médico joanna se le indicó, o si no empieza a mejorar después de leon días.  Busque Prontamente Atención Médica  si algo de lo siguiente ocurre:  · Fiebre de 100.4°F (38°C) o más kendall, o joanna le haya indicado [place] proveedor de atención médica  · Dolor creciente en la espalda o el abdomen  · Imposibilidad de orinar debido al dolor  · Christina nueva descarga desde la uretra, vagina o pene  · Articulaciones hinchadas o doloridas  · Sarpullido  Date Last Reviewed: 3/10/2014  © 9429-0430 [employer]. [address] [place] [address], [place] [address]. Todos los derechos reservados. Esta información no pretende sustituir la atención médica profesional. Sólo [place] médico puede diagnosticar y tratar un problema de shruthi.          Tratamiento de la fascitis plantar  En primer lugar, [place] proveedor de atención médica tratará de determinar la causa de [place] problema para sugerirle formas de aliviar el dolor. Si el dolor se debe a la violetta mecánica del pie, puede ayudar usar unas plantillas hechas a la medida (órtesis).  Reduzca los síntomas    Las siguientes son algunas sugerencias:  · Para aliviar síntomas leves, pruebe tomando aspirina, ibuprofeno u otro medicamento según le indiquen. También podría ayudar frotarse hielo en la jennifer afectada.  · Para reducir un dolor e hinchazón patricio, [place] proveedor de atención médica puede recetarle pastillas, inyecciones o christina férula para caminar en algunos casos. También podrían recomendarle un tipo de fisioterapia, joanna el ultrasonido o un plan de ejercicios diarios de estiramiento. Es raro que se necesite cirugía.  · Para reducir los síntomas producidos por christina violetta mecánica del pie, jorgito vez le  coloquen christina venda adhesiva para john apoyo al arco y controlar los movimientos por un tiempo. Las férulas nocturnas también pueden ayudar a estirar la fascia.  Control del movimiento  Si los vendajes surten efecto, [place] proveedor de atención médica podría recetarle unas órtesis. Elaboradas a partir de un molde de yeso de honey pies, estas órtesis controlan los movimientos del pie y deberían eliminar los síntomas.  Reducción del uso excesivo  Cada vez que [place] pie golpea el suelo, la fascia plantar se estira. Para reducir el esfuerzo de la fascia plantar y la posibilidad de usarla excesivamente, siga estos consejos:  · Adelgace lo necesario.  · Evite correr en terreno rajani o irregular.  · Use las órtesis todo el tiempo en honey zapatos o pantuflas.  Si se necesita cirugía  Si los demás tipos de tratamiento no logran controlar [place] dolor, [place] proveedor de atención médica podría considerar hacerle christina cirugía. Lynnette esta operación, se corta parcialmente la fascia plantar para liberar la tensión. En la etapa de cicatrización, el espacio entre el hueso del talón y la fascia plantar se rellena de tejido fibroso.   Date Last Reviewed: 10/14/2015  © 4868-6718 [employer]. [address] [place] [address], [place] [address]. Todos los derechos reservados. Esta información no pretende sustituir la atención médica profesional. Sólo [place] médico puede diagnosticar y tratar un problema de shruthi.          Fascitis Plantar [Plantar Fasciitis]  La fascitis plantar es christina inflamación dolorosa del tejido que cubre los huesos en la planta de [place] pie. Ésta se puede desarrollar gradualmente o repentinamente. Normalmente afecta a un pie por vez. El dolor de talón puede ser francoise y sentirse joanna un cuchillo clavándose en la planta de [place] pie. El dolor puede aparecer después de hacer ejercicio, correr distancias largas, subir escaleras, estar parado por mucho tiempo, o después de levantarse de christina posición sentado.  Los factores de riesgo incluyen  la artritis, diabetes, obesidad o aumento de peso reciente, pie plano, arco elevado, el uso de tacones altos o zapatos flojos o con un mal soporte de arco.  El dolor de pie derivado de esta condición normalmente es peor alison la mañana y mejora caminando. Al final del día puede arturo christina molestia moderada. El tratamiento requiere reposo a corto plazo y control de la inflamación. Puede christelle hasta nueve meses antes de que los síntomas desparezcan con las medidas descritas a continuación. Raramente, puede ser necesaria christina inyección de esteroides en el pie o cirugía.  Cuidado En [place]:  1. Si usted tiene sobrepeso, adelgace para promover la sanación. Reemplace los zapatos de hacer deporte cuando estén desgastados. No camine o corra descalzo.  2. Elija zapatos con un buen arco de soporte y absorción de los golpes.  3. Las plantillas especiales son christina parte importante del tratamiento. Éstas brindan un soporte óptimo para el arco. Aunque usted puede adquirir plantillas económicas de venta ross, las mejores son las que se elaboran a [place] medida por [place] podólogo (especialista de los pies).   4. Las tablillas o férulas nocturnas (suministradas por un podólogo) mantinenen el talón estirado mientras que duerme, previniendo así el dolor en la mañana.  5. Evite las actividades que esfuercen los pies joanna trotar, permanecer de pie o caminar por mucho tiempo, los deportes de contacto, etc.  6. Lo deirdre que debe hacer en la mañana y antes de hacer deportes, es estirar la planta de los pies. Flexione suavemente [place] tobillo de manera que el pie se mueva hacia la rodilla.  7. Aplicarse hielo puede ayudar a controlar el dolor en el talón. Aplíquese compresas de hielo (cubitos de hielo en christina bolsa plástica, envuelta en christina toalla) en el talón alison 10-20 minutos joanna christina medida preventiva o después de un ataque francoise de los síntomas. Puede repetir esto cada 1-2 horas según sea necesario.  8. Puede usar acetaminofén (Tylenol) o  ibuprofeno (Motrin, Advil) para controlar el dolor, a menos que le receten otro medicamento para el dolor. [NOTA: Si tiene christina enfermedad del hígado o los riñones, o si alguna vez ha tenido christina úlcera estomacal o sangrado gastrointestinal, hable con [place] médico antes de usar estos medicamentos.]  Seguimiento:  con [place] médico o de acuerdo a lo indicado por nuestro personal. Llame para pedir un turno si el dolor empeora o no hay alivio luego de unas semanas de tratamiento en casa. Podría necesitar plantillas, férulas nocturnas, o christina bota de yeso.  [NOTA: Si le hicieron radiografías, serán examinadas por un radiólogo. Le avisarán si hay algo nuevo que pueda afectar [place] atención]  Busque Prontamente Atención Médica Si Algo De Lo Siguiente Ocurre:  · Hinchazón del pie con enrojecimiento o dolor creciente  Date Last Reviewed: 11/21/2015  © 6647-2355 [employer]. [address] [place] [address]. Todos los derechos reservados. Esta información no pretende sustituir la atención médica profesional. Sólo [place] médico puede diagnosticar y tratar un problema de shruthi.          Registrarse para [facility]     La activación de [place] cuenta [facility] es tan fácil joanna 1-2-3!    1) Ir a [email or web address], seleccione Registrarse Ahora, meter el código de activación y [place] fecha de nacimiento, y seleccione Próximo.    [id number]  Expires: 5/9/2017  1:38 AM      2) Crear un nombre de usuario y contraseña para usar cuando se visita [facility] en el futuro y selecciona christina pregunta de seguridad en dada de que pierda [place] contraseña y seleccione Próximo.    3) Introduzca [place] dirección de correo electrónico y violet shabana en Registrarse!    Información Adicional  Si tiene alguna pregunta, por favor, e-mail [email or web address] o llame al [phone] para hablar con nuestro personal. Recuerde, [facility] no debe ser usada para necesidades urgentes. En dada de emergencia médica, llame al 911.        Smoking Cessation     Si desea  dejar de fumar:  · Usted puede ser elegible para recibir servicios gratuitos si usted es un residente de [place] y comenzó a fumar cigarrillos antes del 1988. Llame al Smoking Cessation Trust (SCT) a [phone] o [phone].   Llame 1-800-QUIT-NOW si usted no cumple con los criterios anteriores.   Póngase en contacto con nosotros por correo electrónico: [email or web address]   Visite nuestro sitio web para obtener más información: [email or web address]             [facility]  cumple con las leyes federales aplicables de derechos civiles y no discrimina por motivos de nicole, color, origen nacional, edad, discapacidad, o sexo.        Language Assistance Services     ATTENTION: Language assistance services are available, free of charge. Please call [phone].      ATENCIÓN: Si habla español, tiene a [place] disposición servicios gratuitos de asistencia lingüística. Llame al [phone].     CHÚ Ý: N?u b?n nói Ti?ng Vi?t, có các d?ch v? h? tr? ngôn ng? mi?n phí dành cho b?n. G?i s? [phone].                      [facility]  [id number] [place] [id number]               Ney Quintanilla   2017  1:47 AM   ED    Description:  Male : 1985   Department:  [facility]           Your Care was Coordinated By:     Provider Role From To    Moshe Tatum NP Nurse Practitioner 17 0147 --      Reason for Visit     Leg Pain           Diagnoses this Visit        Comments    Plantar fasciitis of left foot    -  Primary     Dysuria           ED Disposition     None           To Do List           Follow-up Information     Schedule an appointment as soon as possible for a visit with pcp or ER .       These Medications        Disp Refills Start End    acetaminophen (TYLENOL) 500 MG tablet 60 tablet 0 2017     Take 1 tablet (500 mg total) by mouth every 6 (six) hours as needed for Pain. - Oral       [facility]     [facility] Nurse Care Line - 24/7 Assistance  Unless otherwise directed by your provider, please contact [facility], our nurse care line that is available for 24/7 assistance.     Registered nurses in the [facility] provide: appointment scheduling, clinical advisement, health education, and other advisory services.  Call: [phone] (toll free)               Medications           Message regarding Medications     Verify the changes and/or additions to your medication regime listed below are the same as discussed with your clinician today.  If any of these changes or additions are incorrect, please notify your healthcare provider.        START taking these NEW medications        Refills    acetaminophen (TYLENOL) 500 MG tablet 0    Sig: Take 1 tablet (500 mg total) by mouth every 6 (six) hours as needed for Pain.    Class: Print    Route: Oral      These medications were administered today        Dose Freq    acetaminophen tablet 1,000 mg 1,000 mg ED 1 Time    Sig: Take 2 tablets (1,000 mg total) by mouth ED 1 Time.    Class: Normal    Route: Oral           Verify that the below list of medications is an accurate representation of the medications you are currently taking.  If none reported, the list may be blank. If incorrect, please contact your healthcare provider. Carry this list with you in case of emergency.           Current Medications     alprazolam (XANAX) 2 MG Tab Take by mouth 2 (two) times daily as needed.    clonazePAM (KLONOPIN) 1 MG tablet Take 1 tablet (1 mg total) by mouth 3 (three) times daily as needed for Anxiety.    hydrochlorothiazide (HYDRODIURIL) 12.5 MG Tab Take 2 tablets (25 mg total) by mouth once daily.    acetaminophen (TYLENOL) 500 MG tablet Take 1 tablet (500 mg total) by mouth every 6 (six) hours as needed for Pain.    CLONIDINE HCL ORAL Take 0.2 mg by mouth 2 (two) times daily.     hydrocodone-acetaminophen 5-325mg (NORCO) 5-325 mg per  "tablet Take 1 tablet by mouth every 4 (four) hours as needed.    hyoscyamine (ANASPAZ,LEVSIN) 0.125 mg Tab Take 1 tablet (125 mcg total) by mouth every 6 (six) hours as needed.    ondansetron (ZOFRAN) 4 MG tablet Take 1 tablet (4 mg total) by mouth every 8 (eight) hours as needed.           Clinical Reference Information           Your Vitals Were     BP Pulse Temp Resp Height Weight    157/84 (BP Location: Right arm, Patient Position: Sitting) 85 97.7 °F (36.5 °C) (Oral) 16 6' 2" (1.88 m) 86.2 kg (190 lb)    SpO2 BMI                96% 24.39 kg/m2          Allergies as of 4/14/2017        Reactions    Flexeril [Cyclobenzaprine]     Ibuprofen     Toradol [Ketorolac] Itching    Tramadol       Immunizations Administered on Date of Encounter - 4/14/2017     None      ED Micro, Lab, POCT     Start Ordered       Status Ordering Provider    04/14/17 0152 04/14/17 0151  Urinalysis Clean Catch  STAT      Final result       ED Imaging Orders     None        Discharge Instructions         Disuria (Adultos, Causa Incierta) [Dysuria, Adult, Uncertain Cause]    La "uretra" es el canal que permite a la orina salir del cuerpo. Disuria es la sensación de dolor o ardor en la uretra alison el orinado. En christina renee, la uretra es la abertura sobre la vagina. En los hombres, la uretra es la abertura sobre la punta del pene.  La disuria puede ser causada por algo que irrite o inflame la uretra, joanna christina infección o irritación química. La causa de [place] disuria no es cierta. La causa más común de disuria en los adultos es la infección de vejiga. Ésta se diagnostica con un análisis de orina y requiere tratamiento con antibióticos.  La irritación química puede ser causada por jabones, lociones, colonias, productos para la higiene femenina, gelatinas anticonceptivas, cremas y espumas. Se va 1-3 días después de dejar la exposición al producto químico.  La infección de la uretra por transmisión sexual (clamidia o gonorrea) puede causar disuria. " "Si [place] médico sospecha esto, le pueden hacer un cultivo del espécimen. Tomará unos dos días para saber el resultado. En las mujeres posmenopáusicas, la disuria puede venir joanna resultado de christina disminución de la cantidad de líquido lubricante producido por el cuerpo. La disuria se vuelve "crónica" cuando dura semanas o meses, o se va y retorna de nuevo. Para diagnosticar y tratar la disuria crónica puede ser necesaria la derivación a un especialista (urólogo).  Cuidado En [place]:  · Si le tomaron christina muestra de orina para cultivo puede llamar en dos días por el resultado.  · Si se hizo un cultivo para christina enfermedad de transmisión sexual, evite la actividad sexual hasta que [place] médico le diga que no hay ninguna infección. Puede llamar en dos días por los resultados.  · Si le diagnosticaron christina infección transmitida sexualmente:  ¨ [place] darrion sexual necesita ser tratada, aún si no tiene síntomas.  ¨ [place] darrion debe contactar a [place] médico o acudir al Departamento de [place] Pública para que le examinen y traten.  ¨ Evite la actividad sexual hasta que usted y [place] darrion hayan completado todo el medicamento y les digan que ya no son contagiosos.  · Evite cualquier agente químico que usted sospecha puede causar honey síntomas.  · Si le dieron un medicamento recetado, tómelo según le indicaron.  Seguimiento  con [place] médico joanna se le indicó, o si no empieza a mejorar después de leon días.  Busque Prontamente Atención Médica  si algo de lo siguiente ocurre:  · Fiebre de 100.4°F (38°C) o más kendall, o joanna le haya indicado [place] proveedor de atención médica  · Dolor creciente en la espalda o el abdomen  · Imposibilidad de orinar debido al dolor  · Christina nueva descarga desde la uretra, vagina o pene  · Articulaciones hinchadas o doloridas  · Sarpullido  Date Last Reviewed: 3/10/2014  © 3397-3924 [employer]. [address] [place] [address], [place] [address]. Todos los derechos reservados. Esta información no pretende sustituir la atención " médica profesional. Sólo [place] médico puede diagnosticar y tratar un problema de shruthi.          Tratamiento de la fascitis plantar  En primer lugar, [place] proveedor de atención médica tratará de determinar la causa de [place] problema para sugerirle formas de aliviar el dolor. Si el dolor se debe a la violetta mecánica del pie, puede ayudar usar unas plantillas hechas a la medida (órtesis).  Reduzca los síntomas    Las siguientes son algunas sugerencias:  · Para aliviar síntomas leves, pruebe tomando aspirina, ibuprofeno u otro medicamento según le indiquen. También podría ayudar frotarse hielo en la jennifer afectada.  · Para reducir un dolor e hinchazón patricio, [place] proveedor de atención médica puede recetarle pastillas, inyecciones o christina férula para caminar en algunos casos. También podrían recomendarle un tipo de fisioterapia, joanna el ultrasonido o un plan de ejercicios diarios de estiramiento. Es raro que se necesite cirugía.  · Para reducir los síntomas producidos por christina violetta mecánica del pie, jorgito vez le coloquen christina venda adhesiva para john apoyo al arco y controlar los movimientos por un tiempo. Las férulas nocturnas también pueden ayudar a estirar la fascia.  Control del movimiento  Si los vendajes surten efecto, [place] proveedor de atención médica podría recetarle unas órtesis. Elaboradas a partir de un molde de yeso de honey pies, estas órtesis controlan los movimientos del pie y deberían eliminar los síntomas.  Reducción del uso excesivo  Cada vez que [place] pie golpea el suelo, la fascia plantar se estira. Para reducir el esfuerzo de la fascia plantar y la posibilidad de usarla excesivamente, siga estos consejos:  · Adelgace lo necesario.  · Evite correr en terreno rajani o irregular.  · Use las órtesis todo el tiempo en honey zapatos o pantuflas.  Si se necesita cirugía  Si los demás tipos de tratamiento no logran controlar [place] dolor, [place] proveedor de atención médica podría considerar hacerle christina cirugía. Lynnette esta operación, se  corta parcialmente la fascia plantar para liberar la tensión. En la etapa de cicatrización, el espacio entre el hueso del talón y la fascia plantar se rellena de tejido fibroso.   Date Last Reviewed: 10/14/2015  © 7931-0433 [employer]. [address] [place] [id number]. Todos los derechos reservados. Esta información no pretende sustituir la atención médica profesional. Sólo [place] médico puede diagnosticar y tratar un problema de shruthi.          Fascitis Plantar [Plantar Fasciitis]  La fascitis plantar es chirstina inflamación dolorosa del tejido que cubre los huesos en la planta de [place] pie. Ésta se puede desarrollar gradualmente o repentinamente. Normalmente afecta a un pie por vez. El dolor de talón puede ser francoise y sentirse joanna un cuchillo clavándose en la planta de [place] pie. El dolor puede aparecer después de hacer ejercicio, correr distancias largas, subir escaleras, estar parado por mucho tiempo, o después de levantarse de christina posición sentado.  Los factores de riesgo incluyen la artritis, diabetes, obesidad o aumento de peso reciente, pie plano, arco elevado, el uso de tacones altos o zapatos flojos o con un mal soporte de arco.  El dolor de pie derivado de esta condición normalmente es peor alison la mañana y mejora caminando. Al final del día puede arturo christina molestia moderada. El tratamiento requiere reposo a corto plazo y control de la inflamación. Puede christelle hasta nueve meses antes de que los síntomas desparezcan con las medidas descritas a continuación. Raramente, puede ser necesaria christina inyección de esteroides en el pie o cirugía.  Cuidado En Rayne:  1. Si usted tiene sobrepeso, adelgace para promover la sanación. Reemplace los zapatos de hacer deporte cuando estén desgastados. No camine o corra descalzo.  2. Elija zapatos con un buen arco de soporte y absorción de los golpes.  3. Las plantillas especiales son christina parte importante del tratamiento. Éstas brindan un soporte óptimo para el  arco. Aunque usted puede adquirir plantillas económicas de venta ross, las mejores son las que se elaboran a [place] medida por [place] podólogo (especialista de los pies).   4. Las tablillas o férulas nocturnas (suministradas por un podólogo) mantinenen el talón estirado mientras que duerme, previniendo así el dolor en la mañana.  5. Evite las actividades que esfuercen los pies joanna trotar, permanecer de pie o caminar por mucho tiempo, los deportes de contacto, etc.  6. Lo deirdre que debe hacer en la mañana y antes de hacer deportes, es estirar la planta de los pies. Flexione suavemente [place] tobillo de manera que el pie se mueva hacia la rodilla.  7. Aplicarse hielo puede ayudar a controlar el dolor en el talón. Aplíquese compresas de hielo (cubitos de hielo en christina bolsa plástica, envuelta en christina toalla) en el talón alison 10-20 minutos joanna christina medida preventiva o después de un ataque francoise de los síntomas. Puede repetir esto cada 1-2 horas según sea necesario.  8. Puede usar acetaminofén (Tylenol) o ibuprofeno (Motrin, Advil) para controlar el dolor, a menos que le receten otro medicamento para el dolor. [NOTA: Si tiene christina enfermedad del hígado o los riñones, o si alguna vez ha tenido christina úlcera estomacal o sangrado gastrointestinal, hable con [place] médico antes de usar estos medicamentos.]  Seguimiento:  con [place] médico o de acuerdo a lo indicado por nuestro personal. Llame para pedir un turno si el dolor empeora o no hay alivio luego de unas semanas de tratamiento en casa. Podría necesitar plantillas, férulas nocturnas, o christina bota de yeso.  [NOTA: Si le hicieron radiografías, serán examinadas por un radiólogo. Le avisarán si hay algo nuevo que pueda afectar [place] atención]  Busque Prontamente Atención Médica Si Algo De Lo Siguiente Ocurre:  · Hinchazón del pie con enrojecimiento o dolor creciente  Date Last Reviewed: 11/21/2015 © 2000-2016 [employer]. [address] [place] [address], [place] [address]. Todos los  derechos reservados. Esta información no pretende sustituir la atención médica profesional. Sólo [place] médico puede diagnosticar y tratar un problema de shruthi.          [facility] Sign-Up     Activating your [facility] account is as easy as 1-2-3!     1) Visit [email or web address], select Sign Up Now, enter this activation code and your date of birth, then select Next.  [email or web address]  Expires: 5/9/2017  1:38 AM      2) Create a username and password to use when you visit [facility] in the future and select a security question in case you lose your password and select Next.    3) Enter your e-mail address and click Sign Up!    Additional Information  If you have questions, please e-mail [email or web address] or call [phone] to talk to our [facility] staff. Remember, [facility] is NOT to be used for urgent needs. For medical emergencies, dial 911.         Smoking Cessation     If you would like to quit smoking:   You may be eligible for free services if you are a [place] resident and started smoking cigarettes before September 1, 1988.  Call the Smoking Cessation Trust (SCT) toll free at (252) 636-5483 or (456) 450-3779.   Call 8-800-QUIT-NOW if you do not meet the above criteria.   Contact us via email: [email or web address]   View our website for more information: [email or web address]         [facility] complies with applicable Federal civil rights laws and does not discriminate on the basis of race, color, national origin, age, disability, or sex.        Language Assistance Services     ATTENTION: Language assistance services are available, free of charge. Please call [phone].      ATENCIÓN: Si habla español, tiene a [place] disposición servicios gratuitos de asistencia lingüística. Llame al [phone].     CHÚ Ý: N?u b?n nói Ti?ng Vi?t, có các d?ch v? h? tr? ngôn ng? mi?n phí dành cho b?n. G?i s? [phone].

## 2017-04-14 NOTE — ED PROVIDER NOTES
HISTORY     Chief Complaint   Patient presents with    Leg Pain     left leg pain, cloudy urination      Review of patient's allergies indicates:   Allergen Reactions    Flexeril [cyclobenzaprine]     Ibuprofen     Toradol [ketorolac] Itching    Tramadol         HPI   Patient is a 31 y.o. male presenting with the following complaint: foot injury. The history is provided by the spouse.   Foot Injury    The incident occurred at home. The injury mechanism is unknown. The incident occurred several days ago. The pain is present in the left foot. The quality of the pain is described as aching. The pain is at a severity of 9/10. The pain has been constant since onset. He reports no foreign bodies present. The symptoms are aggravated by activity, bearing weight and palpation. He has tried nothing for the symptoms.        PCP: Primary Doctor No     Past Medical History:  Past Medical History:   Diagnosis Date    Anxiety     GERD (gastroesophageal reflux disease)     Hypertension         Past Surgical History:  History reviewed. No pertinent surgical history.     Family History:  Family History   Problem Relation Age of Onset    Hypertension Mother     Diabetes Mother     Heart disease Father     Hypertension Father     Diabetes Father         Social History:  Social History     Social History Main Topics    Smoking status: Current Every Day Smoker     Packs/day: 0.50     Types: Cigarettes    Smokeless tobacco: Never Used    Alcohol use 1.8 oz/week     3 Cans of beer per week      Comment: occasionally    Drug use: Yes     Special: Cocaine    Sexual activity: Yes         ROS   Review of Systems   Constitutional: Negative for fever.   HENT: Negative for mouth sores and sore throat.    Respiratory: Negative for shortness of breath.    Cardiovascular: Negative for chest pain.   Gastrointestinal: Negative for nausea.   Genitourinary: Positive for dysuria.   Musculoskeletal: Negative for back pain.         "Left foot pain   Skin: Negative for rash.   Neurological: Negative for weakness.   Hematological: Does not bruise/bleed easily.       PHYSICAL EXAM   Initial Vitals   BP Pulse Resp Temp SpO2   04/14/17 0131 04/14/17 0131 04/14/17 0131 04/14/17 0131 04/14/17 0131   157/84 85 16 97.7 °F (36.5 °C) 96 %       Physical Exam    Constitutional: He appears well-developed and well-nourished. No distress.   HENT:   Head: Normocephalic and atraumatic.   Eyes: Conjunctivae are normal. Pupils are equal, round, and reactive to light.   Neck: Normal range of motion. Neck supple.   Cardiovascular: Normal rate, regular rhythm and normal heart sounds.   Pulmonary/Chest: Breath sounds normal.   Abdominal: Soft. Bowel sounds are normal.   Musculoskeletal: Normal range of motion.        Feet:    Neurological: He is alert and oriented to person, place, and time. No cranial nerve deficit.   Skin: Skin is warm and dry.   Psychiatric: He has a normal mood and affect.          ED COURSE   Procedures  ED ONGOING VITALS:  Vitals:    04/14/17 0131   BP: (!) 157/84   Pulse: 85   Resp: 16   Temp: 97.7 °F (36.5 °C)   TempSrc: Oral   SpO2: 96%   Weight: 86.2 kg (190 lb)   Height: 6' 2" (1.88 m)         ABNORMAL LAB VALUES:  Labs Reviewed   URINALYSIS         ALL LAB VALUES:    Results for orders placed or performed during the hospital encounter of 04/14/17   Urinalysis Clean Catch   Result Value Ref Range    Specimen UA Urine, Clean Catch     Color, UA Yellow Yellow, Straw, Mariely    Appearance, UA Clear Clear    pH, UA 6.0 5.0 - 8.0    Specific Gravity, UA 1.015 1.005 - 1.030    Protein, UA Negative Negative    Glucose, UA Negative Negative    Ketones, UA Negative Negative    Bilirubin (UA) Negative Negative    Occult Blood UA Negative Negative    Nitrite, UA Negative Negative    Urobilinogen, UA Negative <2.0 EU/dL    Leukocytes, UA Negative Negative         RADIOLOGY STUDIES:  Imaging Results     None                    The above vital signs and " test results have been reviewed by the emergency provider.     ED Medications:  Medications   acetaminophen tablet 1,000 mg (1,000 mg Oral Given 4/14/17 0205)       Discharge Medications:  Discharge Medication List as of 4/14/2017  2:06 AM      START taking these medications    Details   acetaminophen (TYLENOL) 500 MG tablet Take 1 tablet (500 mg total) by mouth every 6 (six) hours as needed for Pain., Starting 4/14/2017, Until Discontinued, Print            Follow-up Information     Schedule an appointment as soon as possible for a visit with pcp or ER .         I discussed with patient and/or family/caretaker that evaluation in the ED does not suggest any emergent or life threatening medical conditions requiring immediate intervention beyond what was provided in the ED, and I believe patient is safe for discharge. Regardless, an unremarkable evaluation in the ED does not preclude the development or presence of a serious or life threatening condition. As such, patient was instructed to return immediately for any worsening or change in current symptoms.    Pre-hypertension/Hypertension: The pt has been informed that they may have pre-hypertension or hypertension based on a blood pressure reading in the ED. I recommend that the pt call the PCP listed on their discharge instructions or a physician of their choice this week to arrange f/u for further evaluation of possible pre-hypertension or hypertension.       MEDICAL DECISION MAKING                 CLINICAL IMPRESSION       ICD-10-CM ICD-9-CM   1. Plantar fasciitis of left foot M72.2 728.71   2. Dysuria R30.0 788.1               Moshe Tatum NP  04/14/17 0280

## 2017-04-24 VITALS
RESPIRATION RATE: 20 BRPM | HEART RATE: 79 BPM | HEIGHT: 74 IN | DIASTOLIC BLOOD PRESSURE: 80 MMHG | BODY MASS INDEX: 23.1 KG/M2 | OXYGEN SATURATION: 96 % | TEMPERATURE: 98 F | WEIGHT: 180 LBS | SYSTOLIC BLOOD PRESSURE: 135 MMHG

## 2017-04-24 PROCEDURE — 99283 EMERGENCY DEPT VISIT LOW MDM: CPT

## 2017-04-25 ENCOUNTER — HOSPITAL ENCOUNTER (EMERGENCY)
Facility: HOSPITAL | Age: 32
Discharge: HOME | End: 2017-04-25
Attending: EMERGENCY MEDICINE

## 2017-04-25 DIAGNOSIS — H60.501 ACUTE OTITIS EXTERNA OF RIGHT EAR, UNSPECIFIED TYPE: Primary | ICD-10-CM

## 2017-04-25 RX ORDER — NEOMYCIN SULFATE, POLYMYXIN B SULFATE AND HYDROCORTISONE 10; 3.5; 1 MG/ML; MG/ML; [USP'U]/ML
4 SUSPENSION/ DROPS AURICULAR (OTIC) 3 TIMES DAILY
Qty: 10 ML | Refills: 0 | Status: SHIPPED | OUTPATIENT
Start: 2017-04-25 | End: 2017-05-05

## 2017-04-25 RX ORDER — ACETAMINOPHEN AND CODEINE PHOSPHATE 300; 30 MG/1; MG/1
1-2 TABLET ORAL EVERY 6 HOURS PRN
Qty: 12 TABLET | Refills: 0 | Status: SHIPPED | OUTPATIENT
Start: 2017-04-25 | End: 2017-05-05

## 2017-04-25 NOTE — ED AVS SNAPSHOT
[facility]  [address] [facility] [place] [address]               Ney Quintanilla   2017 12:01 AM   ED    Descripción:  Male : 1985   Departamento:  [facility]           Fernandez Cuidado fue coordinado por:     Provider Role From To    Si ANH Taylor MD Attending Provider 17 0002 --      Razón de la arun     Otalgia           Diagnósticos de Esta Visita        Comentarios    Acute otitis externa of right ear, unspecified type    -  Primario       ED Disposition     ED Disposition Condition Comment    Discharge             Lista de tareas           Información de seguimiento     Realice un seguimiento con:  Merged with [facility]    Cuándo:  2017    Información de contacto:    [address] [facility] [place] [address]  [phone]          Realice un seguimiento con:  [facility]    Especialidad:  Emergency Medicine    Por qué:  As needed, If symptoms worsen    Información de contacto:    [address] [facility] [address]  [phone]      [facility]     [facility] Línea de Enfermeras - Asistencia   Enfermeras registradas de [facility] pueden ayudarle a reservar christina arun, proveer educación para la shruthi, asesoría clínica, y otros servicios de asesoramiento.   Llame para chase servicio gratuito a [phone].             Medicamentos           Mensaje sobre Medicamentos     Verificar los cambios y / o adiciones a fernandez régimen de medicación son los mismos que discutir con fernandez médico. Si cualquiera de estos cambios o adiciones son incorrectos, por favor notifique a fernandez proveedor de atención médica.             Verifique que la siguiente lista de medicamentos es christina representación exacta de los medicamentos que está tomando actualmente. Si no hay ningunos reportados, la lista puede estar en nicole. Si no es correcta, por favor póngase en contacto con fernandez proveedor de atención médica.  "Lleve esta lista con usted en dada de emergencia.           Medicamentos Actuales     acetaminophen (TYLENOL) 500 MG tablet Take 1 tablet (500 mg total) by mouth every 6 (six) hours as needed for Pain.    alprazolam (XANAX) 2 MG Tab Take by mouth 2 (two) times daily as needed.    clonazePAM (KLONOPIN) 1 MG tablet Take 1 tablet (1 mg total) by mouth 3 (three) times daily as needed for Anxiety.    CLONIDINE HCL ORAL Take 0.2 mg by mouth 2 (two) times daily.     hydrochlorothiazide (HYDRODIURIL) 12.5 MG Tab Take 2 tablets (25 mg total) by mouth once daily.    hydrocodone-acetaminophen 5-325mg (NORCO) 5-325 mg per tablet Take 1 tablet by mouth every 4 (four) hours as needed.    hyoscyamine (ANASPAZ,LEVSIN) 0.125 mg Tab Take 1 tablet (125 mcg total) by mouth every 6 (six) hours as needed.    ondansetron (ZOFRAN) 4 MG tablet Take 1 tablet (4 mg total) by mouth every 8 (eight) hours as needed.           Información de referencia clínica           Janet signos vitales ac     PS Pulso Temperatura Resp Rush Peso    135/80 (BP Location: Right arm, Patient Position: Sitting) 79 97.7 °F (36.5 °C) (Oral) 20 6' 2" (1.88 m) 81.6 kg (180 lb)    SpO2 BMI (IMC)                96% 23.11 kg/m2          Alergias     A partir del:  4/25/2017           Reacciones    Flexeril [Cyclobenzaprine]     Ibuprofen     Toradol [Ketorolac] Itching    Tramadol       Vacunas     Administradas en la fecha de la visita:  4/25/2017        None      ED Micro, Lab, POCT     None      ED Imaging Orders     None        Instrucciones a dileep de kendall         Infección del oído externo (Adulto)     La otitis externa (también llamada "oído de nadador") es christina infección en el canal del oído (auditivo). Generalmente la causan bacterias o un hongo. Puede presentarse unos pocos días después de que quede agua atrapada en el canal auditivo (al nadar o bañarse). También puede aparecer después de christina limpieza muy profunda del canal auditivo con un hisopo de algodón u " otro objeto. A veces, los productos para el cuidado del francisco entran en el canal auditivo y causan chase problema.  Los síntomas pueden incluir dolor, fiebre, comezón, enrojecimiento, secreción o hinchazón del canal auditivo. También puede presentarse christina pérdida temporal de la audición.   Cuidados en [place] casa  · No intente limpiar el canal auditivo. Eso puede empujar el pus y las bacterias y hacer que entren todavía más en el canal.  · Use las gotas que le recetaron para los oídos según las indicaciones. Estas ayudan a reducir la hinchazón y combaten la infección. Si se colocó christina mecha en el canal auditivo, eche las gotas kasey en el extremo de la mecha. Esta hará que el medicamento penetre en el canal auditivo, aunque esté cerrado por la hinchazón.  · Se puede colocar christina hitesh de algodón (que quede floja) en la parte externa de la oreja para absorber el líquido que pudiese supurar del oído.  · A menos que le hayan recetado otro medicamento, puede usar paracetamol (acetaminofén) o ibuprofeno para para controlar el dolor. Nota: Si tiene alguna enfermedad crónica del hígado o de los riñones, o si alguna vez tuvo christina úlcera estomacal o sangrado gastrointestinal, hable con [place] proveedor de atención médica antes de christelle estos medicamentos.  · No permita que le entre agua en el oído alison el baño. Además, evite nadar hasta que se haya curado la infección.   Prevención  · Mantenga secos honey oídos. Eso ayuda a reducir el riesgo de infección. Seque honey orejas con christina toalla o un secador de pelo después de mojarse. Además, use tapones de oído cuando nade.  · No introduzca ningún objeto en el oído para quitar cera.  · Si siente que tiene agua atrapada en [place] oído, colóquese enseguida gotas para los oídos. Puede comprarlas sin receta en la mayoría de las farmacias. Actúan quitando el agua del canal auditivo.   Visitas de control   Programe christina visita de control con [place] proveedor de atención médica en christina semana, o según le  hayan aconsejado.   Cuándo debe buscar atención médica   Llame enseguida a [place] proveedor de atención médica si tiene cualquiera de los siguientes síntomas:  · El dolor de oído empeora o no se tayler después de leon días de tratamiento  · Se presentan enrojecimiento o hinchazón en el oído externo o estos síntomas empeoran  · Dolor de shilpa  · Dolor de paul o rigidez del paul  · Somnolencia o confusión  · Fiebre de 100.4º F (38º C) o más, o según le haya indicado [place] proveedor de atención médica  · Convulsión   Date Last Reviewed: 3/22/2015  © 9958-2848 [employer]. [address] [place] [address]. Todos los derechos reservados. Esta información no pretende sustituir la atención médica profesional. Sólo [place] médico puede diagnosticar y tratar un problema de shruthi.          Registrarse para [facility]     La activación de [place] cuenta [facility] es tan fácil joanna 1-2-3!    1) Ir a [email or web address], seleccione Registrarse Ahora, meter el código de activación y [place] fecha de nacimiento, y seleccione Próximo.    [id number]  Expires: 5/9/2017  1:38 AM      2) Crear un nombre de usuario y contraseña para usar cuando se visita [facility] en el futuro y selecciona christina pregunta de seguridad en dada de que pierda [place] contraseña y seleccione Próximo.    3) Introduzca [place] dirección de correo electrónico y violet shabana en Registrarse!    Información Adicional  Si tiene alguna pregunta, por favor, e-mail [email or web address] o llame al [phone] para hablar con nuestro personal. Recuerde, [facility] no debe ser usada para necesidades urgentes. En dada de emergencia médica, llame al 911.        Smoking Cessation     Si desea dejar de fumar:  · Usted puede ser elegible para recibir servicios gratuitos si usted es un residente de [place] y comenzó a fumar cigarrillos antes del 1 de septiembre de 1988. Llame al Smoking Cessation Trust (SCT) a [phone] o [phone].   Llame 1-800-QUIT-NOW si usted no  cumple con los criterios anteriores.   Póngase en contacto con nosotros por correo electrónico: [email or web address]   Visite nuestro sitio web para obtener más información: [email or web address]             [facility] cumple con las leyes federales aplicables de derechos civiles y no discrimina por motivos de nicole, color, origen nacional, edad, discapacidad, o sexo.        Language Assistance Services     ATTENTION: Language assistance services are available, free of charge. Please call [phone].      ATENCIÓN: Si habla español, tiene a [place] disposición servicios gratuitos de asistencia lingüística. Llame al [phone].     CHÚ Ý: N?u b?n nói Ti?ng Vi?t, có các d?ch v? h? tr? ngôn ng? mi?n phí dành cho b?n. G?i s? [phone].                      [facility]  [id number] [place] [address]               eNy Quintanilla   2017 12:01 AM   ED    Description:  Male : 1985   Department:  [facility]           Your Care was Coordinated By:     Provider Role From To    Hina Taylor MD Attending Provider 17 0002 --      Reason for Visit     Otalgia           Diagnoses this Visit        Comments    Acute otitis externa of right ear, unspecified type    -  Primary       ED Disposition     ED Disposition Condition Comment    Discharge             To Do List           Follow-up Information     Follow up with [facility] In 2 days.    Contact information:    [address] [facility] [address] [phone]          Follow up with [facility].    Specialty:  Emergency Medicine    Why:  As needed, If symptoms worsen    Contact information:    [address] [place] [address] [phone]      [facility]     [facility] Care Line -  Assistance  Unless otherwise directed by your provider, please contact [facility], our nurse care  "line that is available for 24/7 assistance.     Registered nurses in the [facility] provide: appointment scheduling, clinical advisement, health education, and other advisory services.  Call: [phone] (toll free)               Medications           Message regarding Medications     Verify the changes and/or additions to your medication regime listed below are the same as discussed with your clinician today.  If any of these changes or additions are incorrect, please notify your healthcare provider.             Verify that the below list of medications is an accurate representation of the medications you are currently taking.  If none reported, the list may be blank. If incorrect, please contact your healthcare provider. Carry this list with you in case of emergency.           Current Medications     acetaminophen (TYLENOL) 500 MG tablet Take 1 tablet (500 mg total) by mouth every 6 (six) hours as needed for Pain.    alprazolam (XANAX) 2 MG Tab Take by mouth 2 (two) times daily as needed.    clonazePAM (KLONOPIN) 1 MG tablet Take 1 tablet (1 mg total) by mouth 3 (three) times daily as needed for Anxiety.    CLONIDINE HCL ORAL Take 0.2 mg by mouth 2 (two) times daily.     hydrochlorothiazide (HYDRODIURIL) 12.5 MG Tab Take 2 tablets (25 mg total) by mouth once daily.    hydrocodone-acetaminophen 5-325mg (NORCO) 5-325 mg per tablet Take 1 tablet by mouth every 4 (four) hours as needed.    hyoscyamine (ANASPAZ,LEVSIN) 0.125 mg Tab Take 1 tablet (125 mcg total) by mouth every 6 (six) hours as needed.    ondansetron (ZOFRAN) 4 MG tablet Take 1 tablet (4 mg total) by mouth every 8 (eight) hours as needed.           Clinical Reference Information           Your Vitals Were     BP Pulse Temp Resp Height Weight    135/80 (BP Location: Right arm, Patient Position: Sitting) 79 97.7 °F (36.5 °C) (Oral) 20 6' 2" (1.88 m) 81.6 kg (180 lb)    SpO2 BMI                96% 23.11 kg/m2          Allergies as of " "4/25/2017        Reactions    Flexeril [Cyclobenzaprine]     Ibuprofen     Toradol [Ketorolac] Itching    Tramadol       Immunizations Administered on Date of Encounter - 4/25/2017     None      ED Micro, Lab, POCT     None      ED Imaging Orders     None        Discharge Instructions         Infección del oído externo (Adulto)     La otitis externa (también llamada "oído de nadador") es christina infección en el canal del oído (auditivo). Generalmente la causan bacterias o un hongo. Puede presentarse unos pocos días después de que quede agua atrapada en el canal auditivo (al nadar o bañarse). También puede aparecer después de christina limpieza muy profunda del canal auditivo con un hisopo de algodón u otro objeto. A veces, los productos para el cuidado del francisco entran en el canal auditivo y causan chase problema.  Los síntomas pueden incluir dolor, fiebre, comezón, enrojecimiento, secreción o hinchazón del canal auditivo. También puede presentarse christina pérdida temporal de la audición.   Cuidados en [place] casa  · No intente limpiar el canal auditivo. Eso puede empujar el pus y las bacterias y hacer que entren todavía más en el canal.  · Use las gotas que le recetaron para los oídos según las indicaciones. Estas ayudan a reducir la hinchazón y combaten la infección. Si se colocó christina mecha en el canal auditivo, eche las gotas kasey en el extremo de la mecha. Esta hará que el medicamento penetre en el canal auditivo, aunque esté cerrado por la hinchazón.  · Se puede colocar christina hitesh de algodón (que quede floja) en la parte externa de la oreja para absorber el líquido que pudiese supurar del oído.  · A menos que le hayan recetado otro medicamento, puede usar paracetamol (acetaminofén) o ibuprofeno para para controlar el dolor. Nota: Si tiene alguna enfermedad crónica del hígado o de los riñones, o si alguna vez tuvo christina úlcera estomacal o sangrado gastrointestinal, hable con [place] proveedor de atención médica antes de christelle estos " medicamentos.  · No permita que le entre agua en el oído alison el baño. Además, evite nadar hasta que se haya curado la infección.   Prevención  · Mantenga secos honey oídos. Eso ayuda a reducir el riesgo de infección. Seque honey orejas con christina toalla o un secador de pelo después de mojarse. Además, use tapones de oído cuando nade.  · No introduzca ningún objeto en el oído para quitar cera.  · Si siente que tiene agua atrapada en [place] oído, colóquese enseguida gotas para los oídos. Puede comprarlas sin receta en la mayoría de las farmacias. Actúan quitando el agua del canal auditivo.   Visitas de control   Programe christina visita de control con [place] proveedor de atención médica en christina semana, o según le hayan aconsejado.   Cuándo debe buscar atención médica   Llame enseguida a [place] proveedor de atención médica si tiene cualquiera de los siguientes síntomas:  · El dolor de oído empeora o no se tayler después de leon días de tratamiento  · Se presentan enrojecimiento o hinchazón en el oído externo o estos síntomas empeoran  · Dolor de shilpa  · Dolor de paul o rigidez del paul  · Somnolencia o confusión  · Fiebre de 100.4º F (38º C) o más, o según le haya indicado [place] proveedor de atención médica  · Convulsión   Date Last Reviewed: 3/22/2015  © 7948-2509 [employer]. [address] [place] [address]. Todos los derechos reservados. Esta información no pretende sustituir la atención médica profesional. Sólo [place] médico puede diagnosticar y tratar un problema de shruthi.          [facility] Sign-Up     Activating your [facility] account is as easy as 1-2-3!     1) Visit [email or web address], select Sign Up Now, enter this activation code and your date of birth, then select Next.  [email or web address]  Expires: 5/9/2017  1:38 AM      2) Create a username and password to use when you visit [facility] in the future and select a security question in case you lose your password and select Next.    3) Enter your e-mail address  and click Sign Up!    Additional Information  If you have questions, please e-mail [email or web address] or call [phone] to talk to our [employer] staff. Remember, [facility] is NOT to be used for urgent needs. For medical emergencies, dial 911.         Smoking Cessation     If you would like to quit smoking:   You may be eligible for free services if you are a [place] resident and started smoking cigarettes before September 1, 1988.  Call the Smoking Cessation Trust ([facility]) toll free at ([phone] or (523) 711-2874.   Call 4-158-QUIT-NOW if you do not meet the above criteria.   Contact us via email: [email or web address]   View our website for more information: [email or web address]         [facility]  complies with applicable Federal civil rights laws and does not discriminate on the basis of race, color, national origin, age, disability, or sex.        Language Assistance Services     ATTENTION: Language assistance services are available, free of charge. Please call [phone].      ATENCIÓN: Si habla español, tiene a [place] disposición servicios gratuitos de asistencia lingüística. Llame al [phone].     CHÚ Ý: N?u b?n nói Ti?ng Vi?t, có các d?ch v? h? tr? ngôn ng? mi?n phí dành cho b?n. G?i s? [phone].

## 2017-04-25 NOTE — DISCHARGE INSTRUCTIONS
"  Infección del oído externo (Adulto)     La otitis externa (también llamada "oído de nadador") es christina infección en el canal del oído (auditivo). Generalmente la causan bacterias o un hongo. Puede presentarse unos pocos días después de que quede agua atrapada en el canal auditivo (al nadar o bañarse). También puede aparecer después de christina limpieza muy profunda del canal auditivo con un hisopo de algodón u otro objeto. A veces, los productos para el cuidado del francisco entran en el canal auditivo y causan chase problema.  Los síntomas pueden incluir dolor, fiebre, comezón, enrojecimiento, secreción o hinchazón del canal auditivo. También puede presentarse christina pérdida temporal de la audición.   Cuidados en [place] casa  · No intente limpiar el canal auditivo. Eso puede empujar el pus y las bacterias y hacer que entren todavía más en el canal.  · Use las gotas que le recetaron para los oídos según las indicaciones. Estas ayudan a reducir la hinchazón y combaten la infección. Si se colocó christina mecha en el canal auditivo, eche las gotas kasey en el extremo de la mecha. Esta hará que el medicamento penetre en el canal auditivo, aunque esté cerrado por la hinchazón.  · Se puede colocar christina hitesh de algodón (que quede floja) en la parte externa de la oreja para absorber el líquido que pudiese supurar del oído.  · A menos que le hayan recetado otro medicamento, puede usar paracetamol (acetaminofén) o ibuprofeno para para controlar el dolor. Nota: Si tiene alguna enfermedad crónica del hígado o de los riñones, o si alguna vez tuvo christina úlcera estomacal o sangrado gastrointestinal, hable con [place] proveedor de atención médica antes de christelle estos medicamentos.  · No permita que le entre agua en el oído alison el baño. Además, evite nadar hasta que se haya curado la infección.   Prevención  · Mantenga secos honey oídos. Eso ayuda a reducir el riesgo de infección. Seque honey orejas con christina toalla o un secador de pelo después de mojarse. " Además, use tapones de oído cuando nade.  · No introduzca ningún objeto en el oído para quitar cera.  · Si siente que tiene agua atrapada en [place] oído, colóquese enseguida gotas para los oídos. Puede comprarlas sin receta en la mayoría de las farmacias. Actúan quitando el agua del canal auditivo.   Visitas de control   Programe christina visita de control con [place] proveedor de atención médica en christina semana, o según le hayan aconsejado.   Cuándo debe buscar atención médica   Llame enseguida a [place] proveedor de atención médica si tiene cualquiera de los siguientes síntomas:  · El dolor de oído empeora o no se tayler después de leon días de tratamiento  · Se presentan enrojecimiento o hinchazón en el oído externo o estos síntomas empeoran  · Dolor de shilpa  · Dolor de paul o rigidez del paul  · Somnolencia o confusión  · Fiebre de 100.4º F (38º C) o más, o según le haya indicado [place] proveedor de atención médica  · Convulsión   Date Last Reviewed: 3/22/2015  © 6415-9806 [employer]. [address] [place] [address], [place] [address]. Todos los derechos reservados. Esta información no pretende sustituir la atención médica profesional. Sólo [place] médico puede diagnosticar y tratar un problema de shruthi.

## 2017-04-25 NOTE — ED PROVIDER NOTES
SCRIBE #1 NOTE: I, Charbel Nunez, am scribing for, and in the presence of, Hina Taylor MD. I have scribed the entire note.      History      Chief Complaint   Patient presents with    Otalgia     pt reports blood and pus draining from R ear; pt also c/o neck pain; fever       Review of patient's allergies indicates:   Allergen Reactions    Flexeril [cyclobenzaprine]     Ibuprofen     Toradol [ketorolac] Itching    Tramadol         HPI   HPI    4/25/2017, 12:10 AM   History obtained from the friend and patient Translated by friend.      History of Present Illness: Ney Quintanilla is a 31 y.o. male patient who presents to the Emergency Department for right ear pain which onset gradually 1 day ago. Symptoms are constant and moderate in severity. Friend states his ear has been draining pus. No mitigating or exacerbating factors reported. Associated sxs include HA. Patient denies any fever, chills, sore throat, trouble swallowing, facial swelling, cough, congestion, rhinorrhea, lightheadedness, dizziness, and all other sxs at this time. No further complaints or concerns at this time.         Arrival mode: Personal vehicle    PCP: Primary Doctor No       Past Medical History:  Past Medical History:   Diagnosis Date    Anxiety     GERD (gastroesophageal reflux disease)     Hypertension        Past Surgical History:  No past surgical history on file.      Family History:  Family History   Problem Relation Age of Onset    Hypertension Mother     Diabetes Mother     Heart disease Father     Hypertension Father     Diabetes Father        Social History:  Social History     Social History Main Topics    Smoking status: Current Every Day Smoker     Packs/day: 0.50     Types: Cigarettes    Smokeless tobacco: Never Used    Alcohol use 1.8 oz/week     3 Cans of beer per week      Comment: occasionally    Drug use: Yes     Special: Cocaine    Sexual activity: Yes       ROS   Review of Systems   Constitutional:  Negative for chills and fever.   HENT: Positive for ear discharge and ear pain (right). Negative for congestion, facial swelling, sore throat and trouble swallowing.    Respiratory: Negative for cough and shortness of breath.    Cardiovascular: Negative for chest pain.   Gastrointestinal: Negative for diarrhea, nausea and vomiting.   Genitourinary: Negative for dysuria.   Musculoskeletal: Negative for back pain.   Skin: Negative for rash.   Neurological: Positive for headaches. Negative for dizziness, weakness and light-headedness.   Hematological: Does not bruise/bleed easily.       Physical Exam    Initial Vitals   BP Pulse Resp Temp SpO2   04/24/17 2344 04/24/17 2344 04/24/17 2344 04/24/17 2344 04/24/17 2344   135/80 79 20 97.7 °F (36.5 °C) 96 %      Physical Exam  Nursing Notes and Vital Signs Reviewed.  Constitutional: Patient is in no acute distress. Awake and alert. Well-developed and well-nourished.  Head: Atraumatic. Normocephalic.  Eyes: PERRL. EOM intact. Conjunctivae are not pale. No scleral icterus.  Ears: Right TM normal. Left TM normal. No erythema. No bulging. No effusion or air-fluid levels. No perforation. Positive tragus in right ear. Right ear canal is red and swollen.  Nose: Patent nares. Turbinates are normal. No drainage.   Throat: Moist mucous membranes. Posterior oropharynx is symmetric without erythema. Tonsillar exudate is not present. No trismus. Normal handling of secretions. No stridor.  Neck: Supple. Full ROM. No lymphadenopathy.  Cardiovascular: Regular rate. Regular rhythm. No murmurs, rubs, or gallops. Distal pulses are 2+ and symmetric.  Pulmonary/Chest: No respiratory distress. Clear to auscultation bilaterally. No wheezing, rales, or rhonchi.  Abdominal: Soft and non-distended.  There is no tenderness.  No rebound, guarding, or rigidity.  Musculoskeletal: Moves all extremities. No obvious deformities. No edema.  Skin: Warm and dry.  Neurological:  Alert, awake, and appropriate.   "Normal speech.  No acute focal neurological deficits are appreciated.  Psychiatric: Normal affect. Good eye contact. Appropriate in content.    ED Course    Procedures  ED Vital Signs:  Vitals:    04/24/17 2344   BP: 135/80   Pulse: 79   Resp: 20   Temp: 97.7 °F (36.5 °C)   TempSrc: Oral   SpO2: 96%   Weight: 81.6 kg (180 lb)   Height: 6' 2" (1.88 m)              The Emergency Provider reviewed the vital signs and test results, which are outlined above.    ED Discussion     12:13 AM: Discussed pt dx and plan of tx. Gave pt all f/u and return to the ED instructions. All questions and concerns were addressed at this time. Pt expresses understanding of information and instructions, and is comfortable with plan to discharge. Pt is stable for discharge.    I discussed with patient and/or family/caretaker that evaluation in the ED does not suggest any emergent or life threatening medical conditions requiring immediate intervention beyond what was provided in the ED, and I believe patient is safe for discharge.  Regardless, an unremarkable evaluation in the ED does not preclude the development or presence of a serious of life threatening condition. As such, patient was instructed to return immediately for any worsening or change in current symptoms.      ED Medication(s):  Medications - No data to display    Discharge Medication List as of 4/25/2017 12:11 AM          Follow-up Information     Follow up with [facility] In 2 days.    Contact information:    [address] [facility] [address] [phone]          Follow up with [facility] - .    Specialty:  Emergency Medicine    Why:  As needed, If symptoms worsen    Contact information:    [id number] [place] [id number] [phone]            Medical Decision Making              Scribe Attestation:   Scribe #1: I performed the above scribed service and the documentation accurately describes the services " I performed. I attest to the accuracy of the note.    Attending:   Physician Attestation Statement for Scribe #1: I, Hina Taylor MD, personally performed the services described in this documentation, as scribed by Charbel Nunez, in my presence, and it is both accurate and complete.          Clinical Impression       ICD-10-CM ICD-9-CM   1. Acute otitis externa of right ear, unspecified type H60.501 380.10       Disposition:   Disposition: Discharged  Condition: Stable         Hina Taylor MD  04/26/17 0333

## 2017-04-25 NOTE — ED NOTES
Pt see, evaluated, and discharged to the lobby by provider without nursing assessment. See provider notes.

## 2017-05-17 ENCOUNTER — HOSPITAL ENCOUNTER (EMERGENCY)
Facility: HOSPITAL | Age: 32
Discharge: HOME | End: 2017-05-18
Attending: EMERGENCY MEDICINE

## 2017-05-17 DIAGNOSIS — M79.601 RIGHT ARM PAIN: Primary | ICD-10-CM
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
## 2017-05-17 PROCEDURE — 25000003 PHARM REV CODE 250: Performed by: EMERGENCY MEDICINE

## 2017-05-17 PROCEDURE — 99283 EMERGENCY DEPT VISIT LOW MDM: CPT

## 2017-05-17 RX ORDER — HYDROCODONE BITARTRATE AND ACETAMINOPHEN 5; 325 MG/1; MG/1
1 TABLET ORAL
Status: COMPLETED | OUTPATIENT
Start: 2017-05-17 | End: 2017-05-17

## 2017-05-17 RX ADMIN — HYDROCODONE BITARTRATE AND ACETAMINOPHEN 1 TABLET: 5; 325 TABLET ORAL at 11:05

## 2017-05-17 NOTE — ED AVS SNAPSHOT
[facility]  [id number] [place] [id number]               Ney Quintanilla   2017 11:19 PM   ED    Descripción:  Male : 1985   Departamento:  [facility]           [place] Cuidado fue coordinado por:     Provider Role From To    Justin Diaz MD Attending Provider 17 2819 --      Razón de la arun     Fall           Diagnósticos de Esta Visita        Comentarios    Right arm pain    -  Primario     Fall           ED Disposition     Ninguna           Lista de tareas           Información de seguimiento     Realice un seguimiento con:  Orthopedics    Cómo:  Llamar    Cuándo:  2017    Por qué:  As needed        Realice un seguimiento con:  [facility]    Especialidad:  Emergency Medicine    Por qué:  If symptoms worsen    Información de contacto:    [address] [place] [id number] [phone]      Recetas para recoger        Disp Refills Start End    hydrocodone-acetaminophen 5-325mg (NORCO) 5-325 mg per tablet 10 tablet 0 2017     Take 1 tablet by mouth every 4 (four) hours as needed. - Oral      [facility]     [facility] Línea de Enfermeras - Asistencia   Enfermeras registradas de [facility] pueden ayudarle a reservar christina arun, proveer educación para la shruthi, asesoría clínica, y otros servicios de asesoramiento.   Llame para chase servicio gratuito a [phone].             Medicamentos           Mensaje sobre Medicamentos     Verificar los cambios y / o adiciones a [place] régimen de medicación son los mismos que discutir con [place] médico. Si cualquiera de estos cambios o adiciones son incorrectos, por favor notifique a [place] proveedor de atención médica.        EMPEZAR a christelle estos medicamentos NUEVOS        Refills    hydrocodone-acetaminophen 5-325mg (NORCO) 5-325 mg per tablet 0    Sig: Take 1 tablet by mouth every 4 (four) hours as needed.    Categoría: Print     Vía: Oral      These medications were administered today        Dose Freq    hydrocodone-acetaminophen 5-325mg per tablet 1 tablet 1 tablet ED 1 Time    Sig: Take 1 tablet by mouth ED 1 Time.    Categoría: Normal    Vía: Oral           Verifique que la siguiente lista de medicamentos es christina representación exacta de los medicamentos que está tomando actualmente. Si no hay ningunos reportados, la lista puede estar en nicole. Si no es correcta, por favor póngase en contacto con [place] proveedor de atención médica. Lleve esta lista con usted en dada de emergencia.           Medicamentos Actuales     alprazolam (XANAX) 2 MG Tab Take by mouth 2 (two) times daily as needed.    clonazePAM (KLONOPIN) 1 MG tablet Take 1 tablet (1 mg total) by mouth 3 (three) times daily as needed for Anxiety.    CLONIDINE HCL ORAL Take 0.2 mg by mouth 2 (two) times daily.     hydrochlorothiazide (HYDRODIURIL) 12.5 MG Tab Take 2 tablets (25 mg total) by mouth once daily.    hydrocodone-acetaminophen 5-325mg (NORCO) 5-325 mg per tablet Take 1 tablet by mouth every 4 (four) hours as needed.    hyoscyamine (ANASPAZ,LEVSIN) 0.125 mg Tab Take 1 tablet (125 mcg total) by mouth every 6 (six) hours as needed.    ondansetron (ZOFRAN) 4 MG tablet Take 1 tablet (4 mg total) by mouth every 8 (eight) hours as needed.           Información de referencia clínica           Janet signos vitales ac     PS Pulso Temperatura Resp [place] Peso    143/85 (BP Location: Right arm, Patient Position: Sitting) 80 98.1 °F (36.7 °C) (Oral) 18 6' (1.829 m) 83.9 kg (185 lb)    SpO2 BMI (IMC)                96% 25.09 kg/m2          Alergias     A partir del:  5/18/2017           Reacciones    Flexeril [Cyclobenzaprine]     Ibuprofen     Toradol [Ketorolac] Itching    Tramadol       Vacunas     Administradas en la fecha de la visita:  5/18/2017        None      ED Micro, Lab, POCT     None      ED Imaging Orders     Start Ordered       Status Ordering Provider    05/17/17 3916  17  X-Ray Shoulder Trauma Right  1 time imaging      In process     17  X-Ray Elbow Complete Right  1 time imaging      Final result     17  X-Ray Wrist Complete Right  1 time imaging      Final result         Instrucciones a dileep de kendall         Caída, Causa No Determinada [Fall, Uncertain Cause]  Usted se cayó hoy, maicol no se sabe con certeza a qué se debió. Christina caída puede producirse por arturo resbalado, tropezado o perdido el equilibrio. También puede deberse a un episodio de desmayo (fainting spell) o convulsiones (seizure).  [place] no se sabe con certeza a qué se debió [place] caída de hoy, es posible que la causa sea un episodio de desmayo o convulsiones. Eso significa que podría volver a suceder, sin que usted tenga antes ninguna señal de advertencia. Si vuelve a caerse, sin causa aparente, debería regresar a chase centro de inmediato para que le rose otras pruebas. De lo contrario, programe christina VISITA DE CONTROL con [place] médico según se explica más abajo.  Cuidados En [place]:  1) Descanse hoy y reanude honey actividades habituales tan pronto joanna sienta que está regresando a la normalidad. Lo mejor es que se quede con alguien que pueda cuidarlo alison las próximas 24 horas para que esté ahí si usted se  nuevamente.  2) Si se lesionó alison la caída, siga las recomendaciones de [place] médico respecto de la lesión.  3) Si se siente aturdido o mareado, recuéstese de inmediato o siéntese e inclínese hacia adelante, con la shilpa entre las rodillas.  4) Por precaución, no conduzca un auto ni opere equipos peligrosos, no tome tanisha en la hermann si está solo (use la ducha, en cambio) y no vaya a nadar sin compañía hasta que haya visto al médico. Antes de reanudar esas actividades, el médico debe descartar que usted tenga alguna afección que le causa desmayos o convulsiones.  5) Puede usar acetaminofén (acetaminophen) (Tylenol) o ibuprofeno (ibuprofen) (Motrin o  Advil) para controlar el dolor, a menos que le hayan recetado otro calmante (analgésico [pain medicine]). [ NOTA : Si tiene christina enfermedad hepática o renal crónica (chronic liver or kidney disease), o ha tenido alguna vez christina úlcera estomacal (stomach ulcer) o sangrado gastrointestinal (GI bleeding), consulte con [place] médico antes de christelle estos medicamentos.]  6) Mantenga las citas que le hayan programado para hacerle otras pruebas.  Visita De Control:  A menos que le hayan indicado otra cosa, si tuvo otra caída, llame a [place] médico el siguiente día en que el consultorio esté abierto y programe christina visita.  Busque Prontamente Atención Médica  si algo de lo siguiente ocurre:  -- Otra caída sin causa aparente.  -- Mareo, desmayo, o convulsiones (seizure).  -- Dolor de shilpa sandee.  -- Dolor en el pecho o falta de aire.  -- Palpitaciones [palpitations] (el corazón late muy rápido, muy lento o de manera irregular).  -- Leo en el vómito o en la materia fecal (de color negruzco o rojizo).  -- Debilidad en un brazo o christina pierna, o en un lado de la rafael.  -- Dificultades para hablar o briseida.  Date Last Reviewed: 11/5/2015  © 5895-9414 [employer]. [address] [place] [address], [place] [address]. Todos los derechos reservados. Esta información no pretende sustituir la atención médica profesional. Sólo [place] médico puede diagnosticar y tratar un problema de shruthi.          Esguinces y torceduras: Autocuidado     Christina torcedura es christina lesión de los ligamentos (el tejido que conecta los huesos entre sí).        Un esguince es christina lesión de un músculo o un tendón (el tejido que conecta el músculo al hueso).   La mayoría de los esguinces y torceduras pueden tratarse con el cuidado personal. Yamilka si ha tenido un desgarro de tejido o daños a los vasos sanguíneos, a los nervios o a los huesos es importante que llame al médico. La recuperación de un esguince o de christina torcedura puede christelle de 6-8 semanas. [place] objetivo en el cuidado  personal debe ser reducir el dolor e inmovilizar la lesión para facilitar así [place] curación.  Dé soporte a la jennifer lesionada  Envolver la jennifer lesionada proporciona el soporte necesario para las actividades cotidianas de corta duración. Tenga cuidado de no apretar demasiado la envoltura, ya que esto podría reducir aporte de juan jose.  · Utilice un cabestrillo para dileep soporte a christina kadeem, codo u hombro lesionados.  · Si se ha lesionado un tobillo o christina rodilla envuélvalos con cinta elástica.  · Si tiene un dedo lesionado en la mano o en el inmovilícelo sujetándolo con cinta al dedo contiguo.  Utilice frío y calor  El frío reduce la hinchazón. Tanto el frío joanna el calor reducen el dolor. Coloque siempre christina toalla entre la piel y la joseph de frío o calor.  · Aplique hielo o christina compresa fría alison 10-15 minutos cada hora que esté despierto, alison los primeros 2 días.  · Christina vez reducida la hinchazón, aplique frío o calor para controlar el dolor. No aplique calor al final del día, ya que puede causarle hinchazón cuando no esté activo.  Mesa y elevación  El descanso y la elevación de la lesión aceleran [place] curación.  · Eleve la jennifer lesionada por encima del nivel del corazón.  · Mantenga inmovilizada la jennifer lesionada.  · Reduzca el uso de la articulación o extremidad lesionada.  Use medicamentos  · La aspirina reduce el dolor y la inflamación. (Nota: No dé aspirina a christina persona de menos de 19 años, a menos que se la haya recetado el médico.)  · Los sustitutos de la aspirina pueden reducir el dolor, y algunos de ellos reducen también la inflamación. Pregunte al farmacéutico qué sustitutos puede usar.  Llame al médico si:  · No puede  la articulación lesionada, o los huesos hacen un amanda de raspado al intentar moverlos.  · No puede aplicar peso sobre la jennifer lesionada incluso después de 24 horas.  · La parte lesionada está fría, morada o insensible.  · La articulación o la extremidad se ve doblada o  arqueada.  · El dolor aumenta o no mejora alison 4 días.  · Al hacer presión sobre el área lesionada, hay un punto en el que el dolor es muy intenso.   Date Last Reviewed: 9/29/2015  © 9223-9471 [employer]. [address] [place] [address]. Todos los derechos reservados. Esta información no pretende sustituir la atención médica profesional. Sólo [place] médico puede diagnosticar y tratar un problema de shruthi.          R.I.C.E.  R.I.C.E. es el acrónimo de descansar, ponerse hielo, hacer presión y tener levantada la jennifer lesionada, son medidas que le ayudan a tener menos dolor e hinchazón después de christina distensión, esguince, magulladura o golpe sandee. Si se ha lesionado, siga estas sugerencias para christelle medidas lo antes posible.  [place]  El dolor es la forma que tiene el cuerpo de decir que el área lesionada debe descansar. Ya sea que se haya golpeado el codo, la mano, el pie o la rodilla, limite el uso de la parte lesionada para evitar un mayor daño y ayudarse a sanar.  Hielo  Colocarse hielo inmediatamente después de christina lesión ayuda a evitar la hinchazón y a calmar el dolor. No ponga el hielo directamente sobre la piel.  · Envuelva christina bolsa de hielo en christina swapna delgada y colóquela sobre el área lesionada.  · Póngase el hielo alison 10 minutos cada 3 horas, maicol no más de 20 minutos cada vez.  Compresión  Hacer presión (compresión) sobre la lesión ayuda a evitar la hinchazón y sirve de soporte.  · Vende firmemente el área lesionada con christina venda elástica. Si siente cosquilleo en la mano o el pie, cambian de color o los siente fríos cuando los toca, puede ser que el vendaje esté muy ajustado. Mohsen un nuevo vendaje más flojo.   · Si el vendaje se afloja demasiado, vuelva a vendarse.  · No use christina venda elástica alison toda la noche.  Elevación  La elevación es más eficaz cuando se mantiene la lesión elevada más alto que el nivel del corazón. Mantener christina lesión elevada ayuda a reducir la  hinchazón, el dolor y la sensación palpitante.  Llame a [place] proveedor de atención médica si nota cualquiera de estos síntomas:  · Los dedos de la mano o del pie están adormecidos, bee cambiado de color o están fríos cuando los toca.  · La piel está brillante o estirada.  · Empeora el dolor, la hinchazón o el moretón, y no mejoran cuando la lesión se levanta.       Date Last Reviewed: 9/3/2015  © 2137-1932 [employer]. [address] [place] [address], [place] [address]. Todos los derechos reservados. Esta información no pretende sustituir la atención médica profesional. Sólo [place] médico puede diagnosticar y tratar un problema de shruthi.          Registrarse para [facility]     La activación de [place] cuenta [facility] es tan fácil joanna 1-2-3!    1) Ir a [email or web address], seleccione Registrarse Ahora, meter el código de activación y [place] fecha de nacimiento, y seleccione Próximo.    [id number]  Expires: 7/2/2017 12:25 AM      2) Crear un nombre de usuario y contraseña para usar cuando se visita [facility] en el futuro y selecciona christina pregunta de seguridad en dada de que pierda [place] contraseña y seleccione Próximo.    3) Introduzca [place] dirección de correo electrónico y violet [facility] en Registrarse!    Información Adicional  Si tiene alguna pregunta, por favor, e-mail [email or web address] o llame al [phone] para hablar con nuestro personal. Recuerde, [facility] no debe ser usada para necesidades urgentes. En dada de emergencia médica, llame al [phone].        Smoking Cessation     Si desea dejar de fumar:  · Usted puede ser elegible para recibir servicios gratuitos si usted es un residente de [place] y comenzó a fumar cigarrillos antes del 1 de septiembre de 1988. Llame al Smoking Cessation Trust (SCT) a [phone] o [phone].   Llame 1-800-QUIT-NOW si usted no cumple con los criterios anteriores.   Póngase en contacto con nosotros por correo electrónico: [email or web address]   Visite nuestro sitio web para  obtener más información: [email or web address]             [facility] cumple con las leyes federales aplicables de derechos civiles y no discrimina por motivos de nicole, color, origen nacional, edad, discapacidad, o sexo.        Language Assistance Services     ATTENTION: Language assistance services are available, free of charge. Please call [phone].      ATENCIÓN: Si habla español, tiene a [place] disposición servicios gratuitos de asistencia lingüística. Llame al [phone].     CHÚ Ý: N?u b?n nói Ti?ng Vi?t, có các d?ch v? h? tr? ngôn ng? mi?n phí dành cho b?n. G?i s? [phone].                      [facility]  [address] [facility] [id number]               Ney Quintanilla   2017 11:19 PM   ED    Description:  Male : 1985   Department:  [facility]           Your Care was Coordinated By:     Provider Role From To    Justin Diaz MD Attending Provider 17 1324 --      Reason for Visit     Fall           Diagnoses this Visit        Comments    Right arm pain    -  Primary     Fall           ED Disposition     None           To Do List           Follow-up Information     Follow up with Orthopedics. Call in 1 day.    Why:  As needed        Follow up with [facility].    Specialty:  Emergency Medicine    Why:  If symptoms worsen    Contact information:    [id number] [place] [id number] [phone]       These Medications        Disp Refills Start End    hydrocodone-acetaminophen 5-325mg (NORCO) 5-325 mg per tablet 10 tablet 0 2017     Take 1 tablet by mouth every 4 (four) hours as needed. - Oral      [facility]     [facility] Care [facility] - 24/7 Assistance  Unless otherwise directed by your provider, please contact [facility], our nurse care line that is available for 24/7 assistance.     Registered nurses in the [facility] On  Call Center provide: appointment scheduling, clinical advisement, health education, and other advisory services.  Call: [phone] (toll free)               Medications           Message regarding Medications     Verify the changes and/or additions to your medication regime listed below are the same as discussed with your clinician today.  If any of these changes or additions are incorrect, please notify your healthcare provider.        START taking these NEW medications        Refills    hydrocodone-acetaminophen 5-325mg (NORCO) 5-325 mg per tablet 0    Sig: Take 1 tablet by mouth every 4 (four) hours as needed.    Class: Print    Route: Oral      These medications were administered today        Dose Freq    hydrocodone-acetaminophen 5-325mg per tablet 1 tablet 1 tablet ED 1 Time    Sig: Take 1 tablet by mouth ED 1 Time.    Class: Normal    Route: Oral           Verify that the below list of medications is an accurate representation of the medications you are currently taking.  If none reported, the list may be blank. If incorrect, please contact your healthcare provider. Carry this list with you in case of emergency.           Current Medications     alprazolam (XANAX) 2 MG Tab Take by mouth 2 (two) times daily as needed.    clonazePAM (KLONOPIN) 1 MG tablet Take 1 tablet (1 mg total) by mouth 3 (three) times daily as needed for Anxiety.    CLONIDINE HCL ORAL Take 0.2 mg by mouth 2 (two) times daily.     hydrochlorothiazide (HYDRODIURIL) 12.5 MG Tab Take 2 tablets (25 mg total) by mouth once daily.    hydrocodone-acetaminophen 5-325mg (NORCO) 5-325 mg per tablet Take 1 tablet by mouth every 4 (four) hours as needed.    hyoscyamine (ANASPAZ,LEVSIN) 0.125 mg Tab Take 1 tablet (125 mcg total) by mouth every 6 (six) hours as needed.    ondansetron (ZOFRAN) 4 MG tablet Take 1 tablet (4 mg total) by mouth every 8 (eight) hours as needed.           Clinical Reference Information           Your Vitals Were     BP  Pulse Temp Resp Height Weight    143/85 (BP Location: Right arm, Patient Position: Sitting) 80 98.1 °F (36.7 °C) (Oral) 18 6' (1.829 m) 83.9 kg (185 lb)    SpO2 BMI                96% 25.09 kg/m2          Allergies as of 2017        Reactions    Flexeril [Cyclobenzaprine]     Ibuprofen     Toradol [Ketorolac] Itching    Tramadol       Immunizations Administered on Date of Encounter - 2017     None      ED Micro, Lab, POCT     None      ED Imaging Orders     Start Ordered       Status Ordering Provider    17  X-Ray Shoulder Trauma Right  1 time imaging      In process     17  X-Ray Elbow Complete Right  1 time imaging      Final result     17  X-Ray Wrist Complete Right  1 time imaging      Final result         Discharge Instructions         Caída, Causa No Determinada [Fall, Uncertain Cause]  Usted se cayó hoy, maicol no se sabe con certeza a qué se debió. Christina caída puede producirse por arturo resbalado, tropezado o perdido el equilibrio. También puede deberse a un episodio de desmayo (fainting spell) o convulsiones (seizure).  [place] no se sabe con certeza a qué se debió [place] caída de hoy, es posible que la causa sea un episodio de desmayo o convulsiones. Eso significa que podría volver a suceder, sin que usted tenga antes ninguna señal de advertencia. Si vuelve a caerse, sin causa aparente, debería regresar a chase centro de inmediato para que le rose otras pruebas. De lo contrario, programe christina VISITA DE CONTROL con [place] médico según se explica más abajo.  Cuidados En [place]:  1) Descanse hoy y reanude honey actividades habituales tan pronto yuliya sienta que está regresando a la normalidad. Lo mejor es que se quede con alguien que pueda cuidarlo alison las próximas 24 horas para que esté ahí si usted se  nuevamente.  2) Si se lesionó alison la caída, siga las recomendaciones de [place] médico respecto de la lesión.  3) Si se siente aturdido  o mareado, recuéstese de inmediato o siéntese e inclínese hacia adelante, con la shilpa entre las rodillas.  4) Por precaución, no conduzca un auto ni opere equipos peligrosos, no tome tanisha en la hermann si está solo (use la ducha, en cambio) y no vaya a nadar sin compañía hasta que haya visto al médico. Antes de reanudar esas actividades, el médico debe descartar que usted tenga alguna afección que le causa desmayos o convulsiones.  5) Puede usar acetaminofén (acetaminophen) (Tylenol) o ibuprofeno (ibuprofen) (Motrin o Advil) para controlar el dolor, a menos que le hayan recetado otro calmante (analgésico [pain medicine]). [ NOTA : Si tiene christina enfermedad hepática o renal crónica (chronic liver or kidney disease), o ha tenido alguna vez christina úlcera estomacal (stomach ulcer) o sangrado gastrointestinal (GI bleeding), consulte con [place] médico antes de christelle estos medicamentos.]  6) Mantenga las citas que le hayan programado para hacerle otras pruebas.  Visita De Control:  A menos que le hayan indicado otra cosa, si tuvo otra caída, llame a [place] médico el siguiente día en que el consultorio esté abierto y programe christina visita.  Busque Prontamente Atención Médica  si algo de lo siguiente ocurre:  -- Otra caída sin causa aparente.  -- Mareo, desmayo, o convulsiones (seizure).  -- Dolor de shilpa sandee.  -- Dolor en el pecho o falta de aire.  -- Palpitaciones [palpitations] (el corazón late muy rápido, muy lento o de manera irregular).  -- Leo en el vómito o en la materia fecal (de color negruzco o rojizo).  -- Debilidad en un brazo o christina pierna, o en un lado de la rafael.  -- Dificultades para hablar o briseida.  Date Last Reviewed: 11/5/2015  © 2496-3855 [employer]. [address] [place] [address]. Todos los derechos reservados. Esta información no pretende sustituir la atención médica profesional. Sólo [place] médico puede diagnosticar y tratar un problema de shruthi.          Esguinces y torceduras:  Autocuidado     Christina torcedura es christina lesión de los ligamentos (el tejido que conecta los huesos entre sí).        Un esguince es christina lesión de un músculo o un tendón (el tejido que conecta el músculo al hueso).   La mayoría de los esguinces y torceduras pueden tratarse con el cuidado personal. [place] si ha tenido un desgarro de tejido o daños a los vasos sanguíneos, a los nervios o a los huesos es importante que llame al médico. La recuperación de un esguince o de christina torcedura puede christelle de 6-8 semanas. [place] objetivo en el cuidado personal debe ser reducir el dolor e inmovilizar la lesión para facilitar así [place] curación.  Dé soporte a la jennifer lesionada  Envolver la jennifer lesionada proporciona el soporte necesario para las actividades cotidianas de corta duración. Tenga cuidado de no apretar demasiado la envoltura, ya que esto podría reducir aporte de juan jose.  · Utilice un cabestrillo para dileep soporte a christina kadeem, codo u hombro lesionados.  · Si se ha lesionado un tobillo o christina rodilla envuélvalos con cinta elástica.  · Si tiene un dedo lesionado en la mano o en el inmovilícelo sujetándolo con cinta al dedo contiguo.  Utilice frío y calor  El frío reduce la hinchazón. Tanto el frío joanna el calor reducen el dolor. Coloque siempre christina toalla entre la piel y la joseph de frío o calor.  · Aplique hielo o christina compresa fría alison 10-15 minutos cada hora que esté despierto, alison los primeros 2 días.  · Christina vez reducida la hinchazón, aplique frío o calor para controlar el dolor. No aplique calor al final del día, ya que puede causarle hinchazón cuando no esté activo.  [place] y elevación  El descanso y la elevación de la lesión aceleran [place] curación.  · Eleve la jennifer lesionada por encima del nivel del corazón.  · Mantenga inmovilizada la jennifer lesionada.  · Reduzca el uso de la articulación o extremidad lesionada.  Use medicamentos  · La aspirina reduce el dolor y la inflamación. (Nota: No dé aspirina a christina persona de menos  de 19 años, a menos que se la haya recetado el médico.)  · Los sustitutos de la aspirina pueden reducir el dolor, y algunos de ellos reducen también la inflamación. Pregunte al farmacéutico qué sustitutos puede usar.  Llame al médico si:  · No puede  la articulación lesionada, o los huesos hacen un amanda de raspado al intentar moverlos.  · No puede aplicar peso sobre la jennifer lesionada incluso después de 24 horas.  · La parte lesionada está fría, morada o insensible.  · La articulación o la extremidad se ve doblada o arqueada.  · El dolor aumenta o no mejora alison 4 días.  · Al hacer presión sobre el área lesionada, hay un punto en el que el dolor es muy intenso.   Date Last Reviewed: 9/29/2015  © 2514-2980 [employer]. [address] [place] [address]. Todos los derechos reservados. Esta información no pretende sustituir la atención médica profesional. Sólo [place] médico puede diagnosticar y tratar un problema de shruthi.          R.I.C.E.  R.I.C.E. es el acrónimo de descansar, ponerse hielo, hacer presión y tener levantada la jennifer lesionada, son medidas que le ayudan a tener menos dolor e hinchazón después de christina distensión, esguince, magulladura o golpe sandee. Si se ha lesionado, siga estas sugerencias para christelle medidas lo antes posible.  [place]  El dolor es la forma que tiene el cuerpo de decir que el área lesionada debe descansar. Ya sea que se haya golpeado el codo, la mano, el pie o la rodilla, limite el uso de la parte lesionada para evitar un mayor daño y ayudarse a sanar.  Hielo  Colocarse hielo inmediatamente después de christina lesión ayuda a evitar la hinchazón y a calmar el dolor. No ponga el hielo directamente sobre la piel.  · Envuelva christina bolsa de hielo en christina swapna delgada y colóquela sobre el área lesionada.  · Póngase el hielo alison 10 minutos cada 3 horas, maicol no más de 20 minutos cada vez.  Compresión  Hacer presión (compresión) sobre la lesión ayuda a evitar la  hinchazón y sirve de soporte.  · Vende firmemente el área lesionada con christina venda elástica. Si siente cosquilleo en la mano o el pie, cambian de color o los siente fríos cuando los toca, puede ser que el vendaje esté muy ajustado. Mohsen un nuevo vendaje más flojo.   · Si el vendaje se afloja demasiado, vuelva a vendarse.  · No use christina venda elástica alison toda la noche.  Elevación  La elevación es más eficaz cuando se mantiene la lesión elevada más alto que el nivel del corazón. Mantener christina lesión elevada ayuda a reducir la hinchazón, el dolor y la sensación palpitante.  Llame a [place] proveedor de atención médica si nota cualquiera de estos síntomas:  · Los dedos de la mano o del pie están adormecidos, bee cambiado de color o están fríos cuando los toca.  · La piel está brillante o estirada.  · Empeora el dolor, la hinchazón o el moretón, y no mejoran cuando la lesión se levanta.       Date Last Reviewed: 9/3/2015  © 6380-1547 [employer]. [address] [place] [address]. Todos los derechos reservados. Esta información no pretende sustituir la atención médica profesional. Sólo [place] médico puede diagnosticar y tratar un problema de shruthi.          [facility] Sign-Up     Activating your [facility] account is as easy as 1-2-3!     1) Visit [email or web address], select Sign Up Now, enter this activation code and your date of birth, then select Next.  [email or web address]  Expires: 7/2/2017 12:25 AM      2) Create a username and password to use when you visit [facility] in the future and select a security question in case you lose your password and select Next.    3) Enter your e-mail address and click Sign Up!    Additional Information  If you have questions, please e-mail [email or web address] or call [phone] to talk to our [facility] staff. Remember, [facility] is NOT to be used for urgent needs. For medical emergencies, dial 911.         Smoking Cessation     If you would like to quit smoking:   You may  be eligible for free services if you are a [place] resident and started smoking cigarettes before September 1, 1988.  Call the Smoking Cessation Trust (SCT) toll free at (189) 442-5378 or (417) 170-0819.   Call 1-800-QUIT-NOW if you do not meet the above criteria.   Contact us via email: [email or web address]   View our website for more information: [email or web address]         [facility]  complies with applicable Federal civil rights laws and does not discriminate on the basis of race, color, national origin, age, disability, or sex.        Language Assistance Services     ATTENTION: Language assistance services are available, free of charge. Please call [phone].      ATENCIÓN: Si habla haimañol, tiene a [place] disposición servicios gratuitos de asistencia lingüística. Llame al [phone].     CHÚ Ý: N?u b?n nói Ti?ng Vi?t, có các d?ch v? h? tr? ngôn ng? mi?n phí dành cho b?n. G?i s? [phone].

## 2017-05-18 VITALS
BODY MASS INDEX: 25.06 KG/M2 | DIASTOLIC BLOOD PRESSURE: 77 MMHG | OXYGEN SATURATION: 98 % | HEIGHT: 72 IN | SYSTOLIC BLOOD PRESSURE: 131 MMHG | TEMPERATURE: 98 F | WEIGHT: 185 LBS | RESPIRATION RATE: 18 BRPM | HEART RATE: 71 BPM

## 2017-05-18 RX ORDER — HYDROCODONE BITARTRATE AND ACETAMINOPHEN 5; 325 MG/1; MG/1
1 TABLET ORAL EVERY 4 HOURS PRN
Qty: 10 TABLET | Refills: 0 | Status: SHIPPED | OUTPATIENT
Start: 2017-05-18 | End: 2017-07-15

## 2017-05-18 NOTE — DISCHARGE INSTRUCTIONS
Caída, Causa No Determinada [Fall, Uncertain Cause]  Usted se cayó hoy, maicol no se sabe con certeza a qué se debió. Christina caída puede producirse por arturo resbalado, tropezado o perdido el equilibrio. También puede deberse a un episodio de desmayo (fainting spell) o convulsiones (seizure).  Yuliya no se sabe con certeza a qué se debió [place] caída de hoy, es posible que la causa sea un episodio de desmayo o convulsiones. Eso significa que podría volver a suceder, sin que usted tenga antes ninguna señal de advertencia. Si vuelve a caerse, sin causa aparente, debería regresar a chase centro de inmediato para que le rose otras pruebas. De lo contrario, programe christina VISITA DE CONTROL con [place] médico según se explica más abajo.  Cuidados En [place]:  1) Descanse hoy y reanude honey actividades habituales tan pronto yuliya sienta que está regresando a la normalidad. Lo mejor es que se quede con alguien que pueda cuidarlo alison las próximas 24 horas para que esté ahí si usted se  nuevamente.  2) Si se lesionó alison la caída, siga las recomendaciones de [place] médico respecto de la lesión.  3) Si se siente aturdido o mareado, recuéstese de inmediato o siéntese e inclínese hacia adelante, con la shilpa entre las rodillas.  4) Por precaución, no conduzca un auto ni opere equipos peligrosos, no tome tanisha en la hermann si está solo (use la ducha, en cambio) y no vaya a nadar sin compañía hasta que haya visto al médico. Antes de reanudar esas actividades, el médico debe descartar que usted tenga alguna afección que le causa desmayos o convulsiones.  5) Puede usar acetaminofén (acetaminophen) (Tylenol) o ibuprofeno (ibuprofen) (Motrin o Advil) para controlar el dolor, a menos que le hayan recetado otro calmante (analgésico [pain medicine]). [ NOTA : Si tiene christina enfermedad hepática o renal crónica (chronic liver or kidney disease), o ha tenido alguna vez christina úlcera estomacal (stomach ulcer) o sangrado gastrointestinal (GI bleeding),  consulte con [place] médico antes de christelle estos medicamentos.]  6) Mantenga las citas que le hayan programado para hacerle otras pruebas.  Visita De Control:  A menos que le hayan indicado otra cosa, si tuvo otra caída, llame a [place] médico el siguiente día en que el consultorio esté abierto y programe christina visita.  Busque Prontamente Atención Médica  si algo de lo siguiente ocurre:  -- Otra caída sin causa aparente.  -- Mareo, desmayo, o convulsiones (seizure).  -- Dolor de shilpa sandee.  -- Dolor en el pecho o falta de aire.  -- Palpitaciones [palpitations] (el corazón late muy rápido, muy lento o de manera irregular).  -- Leo en el vómito o en la materia fecal (de color negruzco o rojizo).  -- Debilidad en un brazo o christina pierna, o en un lado de la rafael.  -- Dificultades para hablar o briseida.  Date Last Reviewed: 11/5/2015  © 6180-0869 [employer]. [address] [place] [address]. Todos los derechos reservados. Esta información no pretende sustituir la atención médica profesional. Sólo [place] médico puede diagnosticar y tratar un problema de shruthi.          Esguinces y torceduras: Autocuidado     Christina torcedura es christina lesión de los ligamentos (el tejido que conecta los huesos entre sí).        Un esguince es christina lesión de un músculo o un tendón (el tejido que conecta el músculo al hueso).   La mayoría de los esguinces y torceduras pueden tratarse con el cuidado personal. Yamilka si ha tenido un desgarro de tejido o daños a los vasos sanguíneos, a los nervios o a los huesos es importante que llame al médico. La recuperación de un esguince o de christina torcedura puede christelle de 6-8 semanas. [place] objetivo en el cuidado personal debe ser reducir el dolor e inmovilizar la lesión para facilitar así [place] curación.  Dé soporte a la jennifer lesionada  Envolver la jennifer lesionada proporciona el soporte necesario para las actividades cotidianas de corta duración. Tenga cuidado de no apretar demasiado la envoltura, ya que esto  podría reducir aporte de juan jose.  · Utilice un cabestrillo para dileep soporte a christina kadeem, codo u hombro lesionados.  · Si se ha lesionado un tobillo o christina rodilla envuélvalos con cinta elástica.  · Si tiene un dedo lesionado en la mano o en el inmovilícelo sujetándolo con cinta al dedo contiguo.  Utilice frío y calor  El frío reduce la hinchazón. Tanto el frío joanna el calor reducen el dolor. Coloque siempre christina toalla entre la piel y la joseph de frío o calor.  · Aplique hielo o christina compresa fría alison 10-15 minutos cada hora que esté despierto, alison los primeros 2 días.  · Christina vez reducida la hinchazón, aplique frío o calor para controlar el dolor. No aplique calor al final del día, ya que puede causarle hinchazón cuando no esté activo.  [place] y elevación  El descanso y la elevación de la lesión aceleran [place] curación.  · Eleve la jennifer lesionada por encima del nivel del corazón.  · Mantenga inmovilizada la jennifer lesionada.  · Reduzca el uso de la articulación o extremidad lesionada.  Use medicamentos  · La aspirina reduce el dolor y la inflamación. (Nota: No dé aspirina a christina persona de menos de 19 años, a menos que se la haya recetado el médico.)  · Los sustitutos de la aspirina pueden reducir el dolor, y algunos de ellos reducen también la inflamación. Pregunte al farmacéutico qué sustitutos puede usar.  Llame al médico si:  · No puede  la articulación lesionada, o los huesos hacen un amanda de raspado al intentar moverlos.  · No puede aplicar peso sobre la jennifer lesionada incluso después de 24 horas.  · La parte lesionada está fría, morada o insensible.  · La articulación o la extremidad se ve doblada o arqueada.  · El dolor aumenta o no mejora alison 4 días.  · Al hacer presión sobre el área lesionada, hay un punto en el que el dolor es muy intenso.   Date Last Reviewed: 9/29/2015  © 1654-0819 [employer]. [address] [place] [address], [place] [address]. Todos los derechos reservados. Esta  información no pretende sustituir la atención médica profesional. Sólo [place] médico puede diagnosticar y tratar un problema de shruthi.          R.I.C.E.  R.I.C.E. es el acrónimo de descansar, ponerse hielo, hacer presión y tener levantada la jennifer lesionada, son medidas que le ayudan a tener menos dolor e hinchazón después de christina distensión, esguince, magulladura o golpe sandee. Si se ha lesionado, siga estas sugerencias para christelle medidas lo antes posible.  [place]  El dolor es la forma que tiene el cuerpo de decir que el área lesionada debe descansar. Ya sea que se haya golpeado el codo, la mano, el pie o la rodilla, limite el uso de la parte lesionada para evitar un mayor daño y ayudarse a sanar.  Hielo  Colocarse hielo inmediatamente después de christina lesión ayuda a evitar la hinchazón y a calmar el dolor. No ponga el hielo directamente sobre la piel.  · Envuelva christina bolsa de hielo en christina swapna delgada y colóquela sobre el área lesionada.  · Póngase el hielo alison 10 minutos cada 3 horas, maicol no más de 20 minutos cada vez.  Compresión  Hacer presión (compresión) sobre la lesión ayuda a evitar la hinchazón y sirve de soporte.  · Vende firmemente el área lesionada con christina venda elástica. Si siente cosquilleo en la mano o el pie, cambian de color o los siente fríos cuando los toca, puede ser que el vendaje esté muy ajustado. Mohsen un nuevo vendaje más flojo.   · Si el vendaje se afloja demasiado, vuelva a vendarse.  · No use christina venda elástica alison toda la noche.  Elevación  La elevación es más eficaz cuando se mantiene la lesión elevada más alto que el nivel del corazón. Mantener christina lesión elevada ayuda a reducir la hinchazón, el dolor y la sensación palpitante.  Llame a [place] proveedor de atención médica si nota cualquiera de estos síntomas:  · Los dedos de la mano o del pie están adormecidos, bee cambiado de color o están fríos cuando los toca.  · La piel está brillante o estirada.  · Empeora el dolor, la hinchazón o  el jennifer, y no mejoran cuando la lesión se levanta.       Date Last Reviewed: 9/3/2015  © 6574-8125 [employer] [place]. [address] [place] [address], [place] [address]. Todos los derechos reservados. Esta información no pretende sustituir la atención médica profesional. Sólo [place] médico puede diagnosticar y tratar un problema de shruthi.

## 2017-05-18 NOTE — ED NOTES
Pt's wife reports he fell off a 10ft ladder this morning and having pain to R side of body and L knee. The worst pain is to the R shoulder, pt states he is unable to lift arm due to pain.

## 2017-05-18 NOTE — ED PROVIDER NOTES
SCRIBE #1 NOTE: I, Tabitha oGmez, am scribing for, and in the presence of, Justin Diaz MD. I have scribed the entire note.      History      Chief Complaint   Patient presents with    Fall     c/o pain to the right arm after falling        Review of patient's allergies indicates:   Allergen Reactions    Flexeril [cyclobenzaprine]     Ibuprofen     Toradol [ketorolac] Itching    Tramadol         HPI   HPI    5/17/2017, 11:07 PM   History obtained from the patient      History of Present Illness: Ney Quintanilla is a 31 y.o. male patient who presents to the Emergency Department for R arm pain which onset suddenly after a fall. Symptoms are constant and moderate in severity. Pt's girlfriend reports that the pt fell on the R side of his body and that the pt cannot flex his arm. No mitigating or exacerbating factors reported. No other associated sxs reported. Pt denies  any head injury/trauma, LOC, HA, numbness, weakness, dizziness, back pain, neck pain, abd pain, CP, SOB and all other sxs at this time. No further complaints or concerns at this time.         Arrival mode: Personal vehicle     PCP: Primary Doctor No       Past Medical History:  Past Medical History:   Diagnosis Date    Anxiety     GERD (gastroesophageal reflux disease)     Hypertension        Past Surgical History:  History reviewed. No pertinent surgical history.      Family History:  Family History   Problem Relation Age of Onset    Hypertension Mother     Diabetes Mother     Heart disease Father     Hypertension Father     Diabetes Father        Social History:  Social History     Social History Main Topics    Smoking status: Current Every Day Smoker     Packs/day: 0.50     Types: Cigarettes    Smokeless tobacco: Never Used    Alcohol use 1.8 oz/week     3 Cans of beer per week      Comment: occasionally    Drug use: Yes     Special: Cocaine    Sexual activity: Yes       ROS   Review of Systems   Constitutional: Negative for  chills and fever.   HENT: Negative for sore throat and trouble swallowing.         - Head injury   Respiratory: Negative for cough and shortness of breath.    Cardiovascular: Negative for chest pain.   Gastrointestinal: Negative for abdominal pain, diarrhea, nausea and vomiting.   Genitourinary: Negative for dysuria and hematuria.   Musculoskeletal: Negative for back pain, neck pain and neck stiffness.        + R arm pain   Skin: Negative for rash and wound.   Neurological: Negative for weakness, numbness and headaches.        - LOC   Hematological: Does not bruise/bleed easily.   All other systems reviewed and are negative.    Physical Exam    Initial Vitals   BP Pulse Resp Temp SpO2   05/17/17 2259 05/17/17 2259 05/17/17 2259 05/17/17 2259 05/17/17 2259   143/85 80 18 98.1 °F (36.7 °C) 96 %      Physical Exam  Nursing Notes and Vital Signs Reviewed.  Constitutional: Patient is in no apparent distress. Well-developed and well-nourished.  Head: Atraumatic. Normocephalic.  Eyes: PERRL. EOM intact. Conjunctivae are not pale. No scleral icterus.  ENT: Mucous membranes are moist. Oropharynx is clear and symmetric.    Neck: Supple. Full ROM. No lymphadenopathy.  Cardiovascular: Regular rate. Regular rhythm. No murmurs, rubs, or gallops. Distal pulses are 2+ and symmetric.  Pulmonary/Chest: No respiratory distress. Clear to auscultation bilaterally. No wheezing, rales, or rhonchi.  Abdominal: Soft and non-distended.  There is no tenderness.  No rebound, guarding, or rigidity. Good bowel sounds.  Musculoskeletal: No obvious deformities. No edema. No calf tenderness.  Right Hand: No obvious deformity. DROM secondary to pain in the shoulder and wrist. Radial, median, and ulnar nerves are intact. Radial and ulnar pulses are 2+. Normal capillary refill.  Distal sensation is intact.  Skin: Warm and dry.  Neurological:  Alert, awake, and appropriate.  Normal speech.  No acute focal neurological deficits are  appreciated.  Psychiatric: Normal affect. Good eye contact. Appropriate in content.    ED Course    Procedures  ED Vital Signs:  Vitals:    05/17/17 2259   BP: (!) 143/85   Pulse: 80   Resp: 18   Temp: 98.1 °F (36.7 °C)   TempSrc: Oral   SpO2: 96%   Weight: 83.9 kg (185 lb)   Height: 6' (1.829 m)       Abnormal Lab Results:  Labs Reviewed - No data to display   Imaging Results:  Imaging Results         X-Ray Shoulder Trauma Right (Final result) Result time:  05/18/17 00:24:22    Final result by CATIE Caro Sr., MD (05/18/17 00:24:22)    Impression:         Normal study.      Electronically signed by: CATIE CARO MD  Date:     05/18/17  Time:    00:24     Narrative:    3 view x-ray of the right shoulder    Clinical History:     Unspecified fall, initial encounter    Findings:     Comparison was made to a prior examination performed on 5/5/2016.  There is no fracture. There is no dislocation.            X-Ray Wrist Complete Right (Final result) Result time:  05/18/17 00:23:22    Final result by CATIE Caro Sr., MD (05/18/17 00:23:22)    Impression:         Normal study.      Electronically signed by: CATIE CARO MD  Date:     05/18/17  Time:    00:23     Narrative:    4 view x-ray of the right wrist    Clinical History:     Unspecified fall, initial encounter    Findings:     There is no fracture. There is no dislocation.            X-Ray Elbow Complete Right (Final result) Result time:  05/17/17 23:36:59    Final result by CATIE Caro Sr., MD (05/17/17 23:36:59)    Impression:         Normal study.      Electronically signed by: CATIE CARO MD  Date:     05/17/17  Time:    23:36     Narrative:    5 view x-ray of the right elbow    Clinical History:     Unspecified fall, initial encounter    Findings:     There is no fracture. There is no dislocation.                      The Emergency Provider reviewed the vital signs and test results, which are outlined above.    ED Discussion   12:26 AM: Discussed with  pt all pertinent ED information and results. Discussed pt dx and plan of tx. Gave pt all f/u and return to the ED instructions. All questions and concerns were addressed at this time. Pt expresses understanding of information and instructions, and is comfortable with plan to discharge. Pt is stable for discharge.    I discussed with patient and/or family/caretaker that evaluation in the ED does not suggest any emergent or life threatening medical conditions requiring immediate intervention beyond what was provided in the ED, and I believe patient is safe for discharge.  Regardless, an unremarkable evaluation in the ED does not preclude the development or presence of a serious of life threatening condition. As such, patient was instructed to return immediately for any worsening or change in current symptoms.      ED Medication(s):  Medications   hydrocodone-acetaminophen 5-325mg per tablet 1 tablet (1 tablet Oral Given 5/17/17 2395)       New Prescriptions    HYDROCODONE-ACETAMINOPHEN 5-325MG (NORCO) 5-325 MG PER TABLET    Take 1 tablet by mouth every 4 (four) hours as needed.       Follow-up Information     Follow up with Orthopedics. Call in 1 day.    Why:  As needed        Follow up with [facility]    Specialty:  Emergency Medicine    Why:  If symptoms worsen    Contact information:    [address]  [facility] [address] [phone]            Medical Decision Making    Medical Decision Making:   Clinical Tests:   Radiological Study: Ordered and Reviewed           Scribe Attestation:   Scribe #1: I performed the above scribed service and the documentation accurately describes the services I performed. I attest to the accuracy of the note.    Attending:   Physician Attestation Statement for Scribe #1: I, Justin Diaz MD, personally performed the services described in this documentation, as scribed by Tabitha Gomez, in my presence, and it is both accurate and  complete.          Clinical Impression       ICD-10-CM ICD-9-CM   1. Right arm pain M79.601 729.5   2. Fall W19.XXXA E888.9       Disposition:   Disposition: Discharged  Condition: Stable         Justin Diaz MD  05/18/17 0411

## 2017-07-15 ENCOUNTER — HOSPITAL ENCOUNTER (EMERGENCY)
Facility: HOSPITAL | Age: 32
Discharge: HOME | End: 2017-07-15
Attending: EMERGENCY MEDICINE

## 2017-07-15 VITALS
TEMPERATURE: 99 F | HEART RATE: 72 BPM | RESPIRATION RATE: 22 BRPM | WEIGHT: 180 LBS | HEIGHT: 74 IN | SYSTOLIC BLOOD PRESSURE: 141 MMHG | DIASTOLIC BLOOD PRESSURE: 82 MMHG | OXYGEN SATURATION: 95 % | BODY MASS INDEX: 23.1 KG/M2

## 2017-07-15 DIAGNOSIS — M54.50 BILATERAL LOW BACK PAIN WITHOUT SCIATICA, UNSPECIFIED CHRONICITY: ICD-10-CM

## 2017-07-15 DIAGNOSIS — T78.40XA ALLERGIC REACTION, INITIAL ENCOUNTER: Primary | ICD-10-CM

## 2017-07-15 DIAGNOSIS — L29.9 ITCHING: ICD-10-CM

## 2017-07-15 DIAGNOSIS — F41.9 ANXIETY DISORDER, UNSPECIFIED TYPE: ICD-10-CM

## 2017-07-15 PROCEDURE — 96375 TX/PRO/DX INJ NEW DRUG ADDON: CPT

## 2017-07-15 PROCEDURE — 96361 HYDRATE IV INFUSION ADD-ON: CPT

## 2017-07-15 PROCEDURE — 99284 EMERGENCY DEPT VISIT MOD MDM: CPT | Mod: 25

## 2017-07-15 PROCEDURE — 63600175 PHARM REV CODE 636 W HCPCS: Performed by: EMERGENCY MEDICINE

## 2017-07-15 PROCEDURE — 96374 THER/PROPH/DIAG INJ IV PUSH: CPT

## 2017-07-15 PROCEDURE — 25000003 PHARM REV CODE 250: Performed by: EMERGENCY MEDICINE

## 2017-07-15 RX ORDER — LORAZEPAM 1 MG/1
1 TABLET ORAL
Status: COMPLETED | OUTPATIENT
Start: 2017-07-15 | End: 2017-07-15

## 2017-07-15 RX ORDER — FAMOTIDINE 20 MG/1
20 TABLET, FILM COATED ORAL 2 TIMES DAILY
Qty: 20 TABLET | Refills: 0 | Status: SHIPPED | OUTPATIENT
Start: 2017-07-15 | End: 2017-12-07

## 2017-07-15 RX ORDER — ALPRAZOLAM 1 MG/1
0.5 TABLET ORAL 3 TIMES DAILY PRN
Qty: 15 TABLET | Refills: 0 | Status: SHIPPED | OUTPATIENT
Start: 2017-07-15 | End: 2017-08-14

## 2017-07-15 RX ORDER — PREDNISONE 10 MG/1
10 TABLET ORAL DAILY
Qty: 21 TABLET | Refills: 0 | Status: SHIPPED | OUTPATIENT
Start: 2017-07-15 | End: 2017-08-05

## 2017-07-15 RX ORDER — SODIUM CHLORIDE 9 MG/ML
1000 INJECTION, SOLUTION INTRAVENOUS
Status: COMPLETED | OUTPATIENT
Start: 2017-07-15 | End: 2017-07-15

## 2017-07-15 RX ORDER — DIPHENHYDRAMINE HCL 25 MG
50 CAPSULE ORAL EVERY 6 HOURS PRN
Qty: 20 CAPSULE | Refills: 0 | Status: SHIPPED | OUTPATIENT
Start: 2017-07-15

## 2017-07-15 RX ORDER — HYDROCODONE BITARTRATE AND ACETAMINOPHEN 10; 325 MG/1; MG/1
1 TABLET ORAL
Status: COMPLETED | OUTPATIENT
Start: 2017-07-15 | End: 2017-07-15

## 2017-07-15 RX ORDER — LORAZEPAM 2 MG/ML
1 INJECTION INTRAMUSCULAR
Status: COMPLETED | OUTPATIENT
Start: 2017-07-15 | End: 2017-07-15

## 2017-07-15 RX ADMIN — METHYLPREDNISOLONE SODIUM SUCCINATE 125 MG: 125 INJECTION, POWDER, FOR SOLUTION INTRAMUSCULAR; INTRAVENOUS at 06:07

## 2017-07-15 RX ADMIN — SODIUM CHLORIDE 1000 ML: 0.9 INJECTION, SOLUTION INTRAVENOUS at 06:07

## 2017-07-15 RX ADMIN — LORAZEPAM 1 MG: 2 INJECTION INTRAMUSCULAR; INTRAVENOUS at 07:07

## 2017-07-15 RX ADMIN — HYDROCODONE BITARTRATE AND ACETAMINOPHEN 1 TABLET: 10; 325 TABLET ORAL at 06:07

## 2017-07-15 RX ADMIN — LORAZEPAM 1 MG: 1 TABLET ORAL at 06:07

## 2017-07-15 NOTE — ED PROVIDER NOTES
SCRIBE #1 NOTE: I, Charbel Nunez, am scribing for, and in the presence of, Gurmeet Morin Jr., MD. I have scribed the entire note.      History      Chief Complaint   Patient presents with    Allergic Reaction     possible reaction to . 75 mg PO benadryl + 50 mg IM benadryl. still itching.       Review of patient's allergies indicates:   Allergen Reactions    Flexeril [cyclobenzaprine]     Ibuprofen     Toradol [ketorolac] Itching    Tramadol         HPI   HPI    7/15/2017, 6:32 PM   History obtained from the friend and patient Translated by friend      History of Present Illness: Ney Quintanilla is a 32 y.o. male patient who presents to the Emergency Department for a diffuse itching rash which onset suddenly PTA after eating a tortilla. Symptoms are constant and moderate in severity. The friend thinks it could be an allergic reaction to the  she uses for the tortilla machine.  No mitigating or exacerbating factors reported. Associated sxs include left back pain. Patient denies any fever, chills, n/v/d, trouble swallowing, wheezing, cough, SOB, CP, sore throat, HA, lightheadedness, dizziness, and all other sxs at this time. No further complaints or concerns at this time.         Arrival mode: Personal vehicle     PCP: Primary Doctor No       Past Medical History:  Past Medical History:   Diagnosis Date    Anxiety     GERD (gastroesophageal reflux disease)     Hypertension        Past Surgical History:  History reviewed. No pertinent surgical history.      Family History:  Family History   Problem Relation Age of Onset    Hypertension Mother     Diabetes Mother     Heart disease Father     Hypertension Father     Diabetes Father        Social History:  Social History     Social History Main Topics    Smoking status: Current Every Day Smoker     Packs/day: 0.50     Types: Cigarettes    Smokeless tobacco: Never Used    Alcohol use 1.8 oz/week     3 Cans of beer per week      Comment:  occasionally    Drug use:      Types: Cocaine    Sexual activity: Yes       ROS   Review of Systems   Constitutional: Negative for chills and fever.   HENT: Negative for sore throat and trouble swallowing.    Respiratory: Negative for cough, shortness of breath and wheezing.    Cardiovascular: Negative for chest pain.   Gastrointestinal: Negative for diarrhea, nausea and vomiting.   Genitourinary: Negative for dysuria.   Musculoskeletal: Positive for back pain (left).   Skin: Positive for rash.   Neurological: Negative for dizziness, weakness, light-headedness and headaches.   Hematological: Does not bruise/bleed easily.       Physical Exam      Initial Vitals [07/15/17 1820]   BP Pulse Resp Temp SpO2   136/74 (!) 112 18 98.4 °F (36.9 °C) 99 %      MAP       94.67          Physical Exam  Nursing Notes and Vital Signs Reviewed.  Constitutional: Patient is in no acute distress. Well-developed and well-nourished.  Head: Atraumatic. Normocephalic.  Eyes: PERRL. EOM intact. Conjunctivae are not pale. No scleral icterus.  ENT: Mucous membranes are moist. Oropharynx is clear and symmetric.    Neck: Supple. Full ROM. No lymphadenopathy.  Cardiovascular: Regular rate. Regular rhythm. No murmurs, rubs, or gallops. Distal pulses are 2+ and symmetric.  Pulmonary/Chest: No respiratory distress. Clear to auscultation bilaterally. No wheezing, rales, or rhonchi.  Abdominal: Soft and non-distended.  There is no tenderness.  No rebound, guarding, or rigidity.   Musculoskeletal: Moves all extremities. No obvious deformities. No edema.   Skin: Warm and dry. Diffuse pruritic rash. Itching frantically.   Neurological:  Alert, awake, and appropriate.  Normal speech.  No acute focal neurological deficits are appreciated.  Psychiatric: Good eye contact. Appropriate in content. Anxious. Component of anxiety based on pt's behavior and frantic itching.    ED Course    Procedures  ED Vital Signs:  Vitals:    07/15/17 1820 07/15/17 1852   BP:  "136/74 (!) 150/85   Pulse: (!) 112 79   Resp: 18    Temp: 98.4 °F (36.9 °C)    TempSrc: Oral    SpO2: 99% (!) 94%   Weight: 81.6 kg (180 lb)    Height: 6' 2" (1.88 m)                 The Emergency Provider reviewed the vital signs and test results, which are outlined above.    ED Discussion     7:09 PM: Reassessed pt at this time.  Pt states his condition has improved at this time. Discussed with pt all pertinent ED information and results. Discussed pt dx and plan of tx. Gave pt all f/u and return to the ED instructions. All questions and concerns were addressed at this time. Pt expresses understanding of information and instructions, and is comfortable with plan to discharge. Pt is stable for discharge.    Patient is safe for discharge. There is no suggestion of airway or ENT emergency. Patient is hemodynamically stable and there is no suggestion of active anaphylaxis or progressive worsening of current symptoms.      ED Medication(s):  Medications   0.9%  NaCl infusion (1,000 mLs Intravenous New Bag 7/15/17 1853)   hydrocodone-acetaminophen 10-325mg per tablet 1 tablet (1 tablet Oral Given 7/15/17 1852)   methylPREDNISolone sod suc(PF) injection 125 mg (125 mg Intravenous Given 7/15/17 1851)   lorazepam tablet 1 mg (1 mg Oral Given 7/15/17 1852)   lorazepam injection 1 mg (1 mg Intravenous Given 7/15/17 1914)       New Prescriptions    ALPRAZOLAM (XANAX) 1 MG TABLET    Take 0.5 tablets (0.5 mg total) by mouth 3 (three) times daily as needed for Anxiety.    DIPHENHYDRAMINE (BENADRYL) 25 MG CAPSULE    Take 2 each (50 mg total) by mouth every 6 (six) hours as needed for Itching or Allergies.    FAMOTIDINE (PEPCID) 20 MG TABLET    Take 1 tablet (20 mg total) by mouth 2 (two) times daily.    PREDNISONE (DELTASONE) 10 MG TABLET    Take 1 tablet (10 mg total) by mouth once daily. Take 4 tabs x 3 days, then  Take 2 tabs x 3 days, then   Take 1 tab x 3 days.       Follow-up Information     [facility]. " Call in 3 days.    Why:  As needed to schedule appt for recheck  Contact information:  [address] [facility] [id number] [phone]                     Medical Decision Making              Scribe Attestation:   Scribe #1: I performed the above scribed service and the documentation accurately describes the services I performed. I attest to the accuracy of the note.    Attending:   Physician Attestation Statement for Scribe #1: I, Gurmeet Morin Jr., MD, personally performed the services described in this documentation, as scribed by Charbel Nunez, in my presence, and it is both accurate and complete.          Clinical Impression       ICD-10-CM ICD-9-CM   1. Allergic reaction, initial encounter T78.40XA 995.3   2. Itching L29.9 698.9   3. Anxiety disorder, unspecified type F41.9 300.00   4. Bilateral low back pain without sciatica, unspecified chronicity M54.5 724.2       Disposition:   Disposition: Discharged  Condition: Stable         Gurmeet Morin Jr., MD  07/15/17 8486

## 2017-07-16 NOTE — ED NOTES
Pt extremely anxious, scratching self constantly to chest, arms and head. Thrashing about on the bed. Pt pulled off BP cable and broke it from the wall. Leads removed from pt. Dr Morin aware of pt anxiety and behavior. Wife at bedside translating. Instructed to try to have pt do some deep breathing.

## 2017-08-25 ENCOUNTER — HOSPITAL ENCOUNTER (EMERGENCY)
Facility: HOSPITAL | Age: 32
Discharge: HOME | End: 2017-08-25
Attending: EMERGENCY MEDICINE

## 2017-08-25 VITALS
SYSTOLIC BLOOD PRESSURE: 114 MMHG | OXYGEN SATURATION: 97 % | WEIGHT: 175 LBS | RESPIRATION RATE: 15 BRPM | HEIGHT: 72 IN | HEART RATE: 70 BPM | BODY MASS INDEX: 23.7 KG/M2 | DIASTOLIC BLOOD PRESSURE: 66 MMHG | TEMPERATURE: 98 F

## 2017-08-25 DIAGNOSIS — R10.31 ABDOMINAL PAIN, RLQ: ICD-10-CM

## 2017-08-25 DIAGNOSIS — F19.10 SUBSTANCE ABUSE: Primary | ICD-10-CM

## 2017-08-25 LAB
ALBUMIN SERPL BCP-MCNC: 3.8 G/DL
ALP SERPL-CCNC: 71 U/L
ALT SERPL W/O P-5'-P-CCNC: 14 U/L
AMPHET+METHAMPHET UR QL: NEGATIVE
ANION GAP SERPL CALC-SCNC: 11 MMOL/L
AST SERPL-CCNC: 19 U/L
BARBITURATES UR QL SCN>200 NG/ML: NEGATIVE
BASOPHILS # BLD AUTO: 0.03 K/UL
BASOPHILS NFR BLD: 0.3 %
BENZODIAZ UR QL SCN>200 NG/ML: NORMAL
BILIRUB SERPL-MCNC: 0.3 MG/DL
BILIRUB UR QL STRIP: NEGATIVE
BUN SERPL-MCNC: 17 MG/DL
BZE UR QL SCN: NORMAL
CALCIUM SERPL-MCNC: 9.5 MG/DL
CANNABINOIDS UR QL SCN: NEGATIVE
CHLORIDE SERPL-SCNC: 104 MMOL/L
CLARITY UR: CLEAR
CO2 SERPL-SCNC: 24 MMOL/L
COLOR UR: YELLOW
CREAT SERPL-MCNC: 1.5 MG/DL
CREAT UR-MCNC: 34 MG/DL
DIFFERENTIAL METHOD: NORMAL
EOSINOPHIL # BLD AUTO: 0.2 K/UL
EOSINOPHIL NFR BLD: 2.5 %
ERYTHROCYTE [DISTWIDTH] IN BLOOD BY AUTOMATED COUNT: 14 %
EST. GFR  (AFRICAN AMERICAN): >60 ML/MIN/1.73 M^2
EST. GFR  (NON AFRICAN AMERICAN): >60 ML/MIN/1.73 M^2
ETHANOL SERPL-MCNC: <10 MG/DL
GLUCOSE SERPL-MCNC: 87 MG/DL
GLUCOSE UR QL STRIP: NEGATIVE
HCT VFR BLD AUTO: 43.4 %
HGB BLD-MCNC: 14.5 G/DL
HGB UR QL STRIP: NEGATIVE
KETONES UR QL STRIP: NEGATIVE
LEUKOCYTE ESTERASE UR QL STRIP: NEGATIVE
LIPASE SERPL-CCNC: 41 U/L
LYMPHOCYTES # BLD AUTO: 3 K/UL
LYMPHOCYTES NFR BLD: 34 %
MCH RBC QN AUTO: 29.1 PG
MCHC RBC AUTO-ENTMCNC: 33.4 G/DL
MCV RBC AUTO: 87 FL
METHADONE UR QL SCN>300 NG/ML: NEGATIVE
MONOCYTES # BLD AUTO: 0.7 K/UL
MONOCYTES NFR BLD: 8.3 %
NEUTROPHILS # BLD AUTO: 4.9 K/UL
NEUTROPHILS NFR BLD: 54.9 %
NITRITE UR QL STRIP: NEGATIVE
OPIATES UR QL SCN: NORMAL
PCP UR QL SCN>25 NG/ML: NEGATIVE
PH UR STRIP: 7 [PH] (ref 5–8)
PLATELET # BLD AUTO: 256 K/UL
PMV BLD AUTO: 10.7 FL
POTASSIUM SERPL-SCNC: 4 MMOL/L
PROT SERPL-MCNC: 7.1 G/DL
PROT UR QL STRIP: NEGATIVE
RBC # BLD AUTO: 4.99 M/UL
SODIUM SERPL-SCNC: 139 MMOL/L
SP GR UR STRIP: <=1.005 (ref 1–1.03)
TOXICOLOGY INFORMATION: NORMAL
URN SPEC COLLECT METH UR: ABNORMAL
UROBILINOGEN UR STRIP-ACNC: NEGATIVE EU/DL
WBC # BLD AUTO: 8.91 K/UL

## 2017-08-25 PROCEDURE — 99284 EMERGENCY DEPT VISIT MOD MDM: CPT | Mod: 25

## 2017-08-25 PROCEDURE — 96375 TX/PRO/DX INJ NEW DRUG ADDON: CPT

## 2017-08-25 PROCEDURE — 96374 THER/PROPH/DIAG INJ IV PUSH: CPT

## 2017-08-25 PROCEDURE — 83690 ASSAY OF LIPASE: CPT

## 2017-08-25 PROCEDURE — 63600175 PHARM REV CODE 636 W HCPCS: Performed by: EMERGENCY MEDICINE

## 2017-08-25 PROCEDURE — 80053 COMPREHEN METABOLIC PANEL: CPT

## 2017-08-25 PROCEDURE — 80307 DRUG TEST PRSMV CHEM ANLYZR: CPT

## 2017-08-25 PROCEDURE — 25500020 PHARM REV CODE 255: Performed by: EMERGENCY MEDICINE

## 2017-08-25 PROCEDURE — 80320 DRUG SCREEN QUANTALCOHOLS: CPT

## 2017-08-25 PROCEDURE — 96361 HYDRATE IV INFUSION ADD-ON: CPT

## 2017-08-25 PROCEDURE — 81003 URINALYSIS AUTO W/O SCOPE: CPT

## 2017-08-25 PROCEDURE — 85025 COMPLETE CBC W/AUTO DIFF WBC: CPT

## 2017-08-25 PROCEDURE — 25000003 PHARM REV CODE 250: Performed by: EMERGENCY MEDICINE

## 2017-08-25 RX ORDER — SODIUM CHLORIDE 9 MG/ML
1000 INJECTION, SOLUTION INTRAVENOUS ONCE
Status: COMPLETED | OUTPATIENT
Start: 2017-08-25 | End: 2017-08-25

## 2017-08-25 RX ORDER — DIPHENHYDRAMINE HYDROCHLORIDE 50 MG/ML
25 INJECTION INTRAMUSCULAR; INTRAVENOUS
Status: COMPLETED | OUTPATIENT
Start: 2017-08-25 | End: 2017-08-25

## 2017-08-25 RX ORDER — MORPHINE SULFATE 4 MG/ML
4 INJECTION, SOLUTION INTRAMUSCULAR; INTRAVENOUS
Status: COMPLETED | OUTPATIENT
Start: 2017-08-25 | End: 2017-08-25

## 2017-08-25 RX ORDER — ONDANSETRON 4 MG/1
4 TABLET, FILM COATED ORAL EVERY 6 HOURS
Qty: 12 TABLET | Refills: 0 | Status: SHIPPED | OUTPATIENT
Start: 2017-08-25 | End: 2017-12-07

## 2017-08-25 RX ORDER — ONDANSETRON 2 MG/ML
4 INJECTION INTRAMUSCULAR; INTRAVENOUS
Status: COMPLETED | OUTPATIENT
Start: 2017-08-25 | End: 2017-08-25

## 2017-08-25 RX ADMIN — DIPHENHYDRAMINE HYDROCHLORIDE 25 MG: 50 INJECTION, SOLUTION INTRAMUSCULAR; INTRAVENOUS at 09:08

## 2017-08-25 RX ADMIN — ONDANSETRON 4 MG: 2 INJECTION INTRAMUSCULAR; INTRAVENOUS at 08:08

## 2017-08-25 RX ADMIN — SODIUM CHLORIDE 1000 ML: 0.9 INJECTION, SOLUTION INTRAVENOUS at 08:08

## 2017-08-25 RX ADMIN — IOHEXOL 75 ML: 350 INJECTION, SOLUTION INTRAVENOUS at 08:08

## 2017-08-25 RX ADMIN — MORPHINE SULFATE 4 MG: 4 INJECTION, SOLUTION INTRAMUSCULAR; INTRAVENOUS at 08:08

## 2017-08-26 NOTE — ED NOTES
Pt started itching while in CT after contrast administration. Dr. Rapp aware and ordered IV benadryl.

## 2017-08-26 NOTE — ED NOTES
Pt's wife states he fell off a roof about 2 week ago and again slipped off a roof again a week ago. Pt c/o back pain, R sided abd pain and headache.

## 2017-08-26 NOTE — ED PROVIDER NOTES
SCRIBE #1 NOTE: I, Jaymie Barrett, am scribing for, and in the presence of, Jose Rapp MD. I have scribed the entire note.      History      Chief Complaint   Patient presents with    Alcohol Problem     wife reports pt feeling anxious since uncle passed.     Back Pain       Review of patient's allergies indicates:   Allergen Reactions    Flexeril [cyclobenzaprine]     Ibuprofen     Toradol [ketorolac] Itching    Tramadol         HPI   HPI    8/25/2017, 8:05 PM   History obtained from the patient with wife translating      History of Present Illness: Ney Jhaveri is a 32 y.o. male patient who presents to the Emergency Department for abd pain onset gradually today. Symptoms are constant and moderate in severity. No modifying factors reported. Associated sxs include n/v. Pt also c/o lower back pain onset suddenly 1 week ago after falling off a roof, landing on both legs. Pt was seen at urgent care today for sxs and had back X-ray. Pt still complaining of pain. Patient denies any fever, chills, diarrhea, SOB, CP, dysuria, hematuria, and all other sxs at this time. No prior tx reported. No further complaints or concerns at this time.         Arrival mode: Personal vehicle    PCP: Primary Doctor No       Past Medical History:  Past Medical History:   Diagnosis Date    Anxiety     GERD (gastroesophageal reflux disease)     Hypertension        Past Surgical History:  History reviewed. No pertinent surgical history.      Family History:  Family History   Problem Relation Age of Onset    Hypertension Mother     Diabetes Mother     Heart disease Father     Hypertension Father     Diabetes Father        Social History:  Social History     Social History Main Topics    Smoking status: Current Every Day Smoker     Packs/day: 0.50     Types: Cigarettes    Smokeless tobacco: Never Used    Alcohol use No      Comment: quite drinking    Drug use:      Types: Cocaine    Sexual activity: Yes       ROS    Review of Systems   Constitutional: Negative for chills and fever.   HENT: Negative for congestion and sore throat.    Respiratory: Negative for shortness of breath.    Cardiovascular: Negative for chest pain.   Gastrointestinal: Positive for abdominal pain, nausea and vomiting. Negative for diarrhea.   Genitourinary: Negative for dysuria and hematuria.   Musculoskeletal: Positive for back pain.   Skin: Negative for rash.   Neurological: Negative for dizziness, weakness and light-headedness.   Hematological: Does not bruise/bleed easily.   All other systems reviewed and are negative.      Physical Exam      Initial Vitals [08/25/17 1945]   BP Pulse Resp Temp SpO2   136/81 91 19 97.8 °F (36.6 °C) 99 %      MAP       99.33          Physical Exam  Nursing Notes and Vital Signs Reviewed.  Constitutional: Patient is in mild distress. Well-developed and well-nourished.  Head: Atraumatic. Normocephalic.  Eyes: PERRL. EOM intact. Conjunctivae are not pale. No scleral icterus.  ENT: Mucous membranes are moist. Oropharynx is clear and symmetric.    Neck: Supple. Full ROM. No lymphadenopathy. No cervical midline bony tenderness, deformities, or step-offs.   Back: Midline lumbar spinal tenderness. No midline bony tenderness, deformities, or step-offs of the T-spine or L-spine. Skin appears normal without abrasions or bruising. No erythema, induration, or fluctuance.   Cardiovascular: Regular rate. Regular rhythm. No murmurs, rubs, or gallops. Distal pulses are 2+ and symmetric.  Pulmonary/Chest: No respiratory distress. Clear to auscultation bilaterally. No wheezing, rales, or rhonchi.  Abdominal: Soft and non-distended.  RLQ tenderness.  No rebound, guarding, or rigidity. Good bowel sounds.  Musculoskeletal: Moves all extremities. No obvious deformities. No edema. No calf tenderness.  Skin: Warm and dry.  Neurological:  Alert, awake, and appropriate.  Normal speech.  Grossly neurologically intact.  Psychiatric: Normal mood  and affect. Good eye contact. Appropriate in content.    ED Course    Procedures  ED Vital Signs:  Vitals:    08/25/17 1945 08/25/17 2132 08/25/17 2133 08/25/17 2231   BP: 136/81 122/82  114/66   Pulse: 91 73 73 70   Resp: 19 11 18 15   Temp: 97.8 °F (36.6 °C)      TempSrc: Oral      SpO2: 99%  100% 97%   Weight: 79.4 kg (175 lb)      Height: 6' (1.829 m)          Abnormal Lab Results:  Labs Reviewed   COMPREHENSIVE METABOLIC PANEL - Abnormal; Notable for the following:        Result Value    Creatinine 1.5 (*)     All other components within normal limits   URINALYSIS - Abnormal; Notable for the following:     Specific Gravity, UA <=1.005 (*)     All other components within normal limits   CBC W/ AUTO DIFFERENTIAL   LIPASE   ALCOHOL,MEDICAL (ETHANOL)   DRUG SCREEN PANEL, URINE EMERGENCY        All Lab Results:  Results for orders placed or performed during the hospital encounter of 08/25/17   CBC auto differential   Result Value Ref Range    WBC 8.91 3.90 - 12.70 K/uL    RBC 4.99 4.60 - 6.20 M/uL    Hemoglobin 14.5 14.0 - 18.0 g/dL    Hematocrit 43.4 40.0 - 54.0 %    MCV 87 82 - 98 fL    MCH 29.1 27.0 - 31.0 pg    MCHC 33.4 32.0 - 36.0 g/dL    RDW 14.0 11.5 - 14.5 %    Platelets 256 150 - 350 K/uL    MPV 10.7 9.2 - 12.9 fL    Gran # 4.9 1.8 - 7.7 K/uL    Lymph # 3.0 1.0 - 4.8 K/uL    Mono # 0.7 0.3 - 1.0 K/uL    Eos # 0.2 0.0 - 0.5 K/uL    Baso # 0.03 0.00 - 0.20 K/uL    Gran% 54.9 38.0 - 73.0 %    Lymph% 34.0 18.0 - 48.0 %    Mono% 8.3 4.0 - 15.0 %    Eosinophil% 2.5 0.0 - 8.0 %    Basophil% 0.3 0.0 - 1.9 %    Differential Method Automated    Comprehensive metabolic panel   Result Value Ref Range    Sodium 139 136 - 145 mmol/L    Potassium 4.0 3.5 - 5.1 mmol/L    Chloride 104 95 - 110 mmol/L    CO2 24 23 - 29 mmol/L    Glucose 87 70 - 110 mg/dL    BUN, Bld 17 6 - 20 mg/dL    Creatinine 1.5 (H) 0.5 - 1.4 mg/dL    Calcium 9.5 8.7 - 10.5 mg/dL    Total Protein 7.1 6.0 - 8.4 g/dL    Albumin 3.8 3.5 - 5.2 g/dL    Total  Bilirubin 0.3 0.1 - 1.0 mg/dL    Alkaline Phosphatase 71 55 - 135 U/L    AST 19 10 - 40 U/L    ALT 14 10 - 44 U/L    Anion Gap 11 8 - 16 mmol/L    eGFR if African American >60 >60 mL/min/1.73 m^2    eGFR if non African American >60 >60 mL/min/1.73 m^2   Lipase   Result Value Ref Range    Lipase 41 4 - 60 U/L   Urinalysis   Result Value Ref Range    Specimen UA Urine, Clean Catch     Color, UA Yellow Yellow, Straw, Mariely    Appearance, UA Clear Clear    pH, UA 7.0 5.0 - 8.0    Specific Gravity, UA <=1.005 (A) 1.005 - 1.030    Protein, UA Negative Negative    Glucose, UA Negative Negative    Ketones, UA Negative Negative    Bilirubin (UA) Negative Negative    Occult Blood UA Negative Negative    Nitrite, UA Negative Negative    Urobilinogen, UA Negative <2.0 EU/dL    Leukocytes, UA Negative Negative   Ethanol   Result Value Ref Range    Alcohol, Medical, Serum <10 <10 mg/dL   Drug screen panel, emergency   Result Value Ref Range    Benzodiazepines Presumptive Positive     Methadone metabolites Negative     Cocaine (Metab.) Presumptive Positive     Opiate Scrn, Ur Presumptive Positive     Barbiturate Screen, Ur Negative     Amphetamine Screen, Ur Negative     THC Negative     Phencyclidine Negative     Creatinine, Random Ur 34.0 23.0 - 375.0 mg/dL    Toxicology Information SEE COMMENT          Imaging Results:  Imaging Results          CT Abdomen Pelvis With Contrast (Final result)  Result time 08/25/17 21:01:55    Final result by Jose A Gonzalez MD (08/25/17 21:01:55)                 Impression:        Negative or acute inflammatory process of the abdomen or pelvis.  Please note the extent visualized.    Bilateral L5 pars defects.    No significant change since 03/25/2017.      All CT scans at this facility use dose modulation, iterative reconstruction and/or weight based dosing when appropriate to reduced radiation dose to as low as reasonably achievable.        Electronically signed by: JOSE A GONZALEZ  MD  Date:     08/25/17  Time:    21:01              Narrative:    EXAM:CT ABDOMEN PELVIS WITH CONTRAST 08/25/17 20:51:00      HISTORY: Left upper quadrant abdominal pain    TECHNIQUE: Standard axial imaging performed with reformatted sagittal and coronal images.       COMPARISON: 03/25/2017      FINDINGS:    Lung bases are clear.    The liver, gallbladder, portal vein, spleen, pancreas, adrenal glands, aorta, IVC are normal.    The left kidney and left shoulder are normal.  Subcentimeter cyst posterior aspect mid right kidney.  Otherwise right kidney normal.  Ureters normal in caliber.    The stomach and small intestine are normal.  The appendix is nonvisualized.  No inflammatory changes identified in the right lower quadrant.  Colon is normal the rectum is normal    Bladder is normal.  Prostate normal.    Bilateral L5 spondylitic defects.  No other bony abnormality detected.  No change since the previous exam.                                      The Emergency Provider reviewed the vital signs and test results, which are outlined above.    ED Discussion     8:12 PM: Pt states he is not allergic to tramadol.     10:30 PM: Re-evaluated pt. Pt is sleeping and in no apparent distress.    11:18 PM: Reassessed pt at this time.  Pt is resting comfortably and is in no distress. Discussed with pt all pertinent ED information and results. Discussed pt dx and plan of tx. Gave pt all PCP f/u and return to the ED instructions. All questions and concerns were addressed at this time. Pt expresses understanding of information and instructions, and is comfortable with plan to discharge. Pt is stable for discharge.    I discussed with patient and/or family/caretaker that evaluation in the ED does not suggest any emergent or life threatening medical conditions requiring immediate intervention beyond what was provided in the ED, and I believe patient is safe for discharge.  Regardless, an unremarkable evaluation in the ED does not  preclude the development or presence of a serious of life threatening condition. As such, patient was instructed to return immediately for any worsening or change in current symptoms.    ED Medication(s):  Medications   0.9%  NaCl infusion (0 mLs Intravenous Stopped 8/25/17 2135)   ondansetron injection 4 mg (4 mg Intravenous Given 8/25/17 2031)   morphine injection 4 mg (4 mg Intravenous Given 8/25/17 2033)   omnipaque 350 iohexol 75 mL (75 mLs Intravenous Given 8/25/17 2052)   diphenhydrAMINE injection 25 mg (25 mg Intravenous Given 8/25/17 2115)       New Prescriptions    ONDANSETRON (ZOFRAN) 4 MG TABLET    Take 1 tablet (4 mg total) by mouth every 6 (six) hours.       Follow-up Information     [facility] -  In 1 day.    Specialty:  Emergency Medicine  Why:  If symptoms worsen.  Patient should return to the ED for any concerns or worsening of condition.  Contact information:  [id number] [place] [id number] [phone]                   Medical Decision Making    Medical Decision Making:   Clinical Tests:   Lab Tests: Ordered and Reviewed  Radiological Study: Ordered and Reviewed           Scribe Attestation:   Scribe #1: I performed the above scribed service and the documentation accurately describes the services I performed. I attest to the accuracy of the note.    Attending:   Physician Attestation Statement for Scribe #1: I, Jose Rapp MD, personally performed the services described in this documentation, as scribed by Jaymie Barrett, in my presence, and it is both accurate and complete.          Clinical Impression       ICD-10-CM ICD-9-CM   1. Abdominal pain, RLQ R10.31 789.03       Disposition:   Disposition: Discharged  Condition: Stable         Jose Rapp MD  08/26/17 0532

## 2017-09-10 ENCOUNTER — HOSPITAL ENCOUNTER (EMERGENCY)
Facility: HOSPITAL | Age: 32
Discharge: HOME | End: 2017-09-10

## 2017-09-10 VITALS
RESPIRATION RATE: 16 BRPM | TEMPERATURE: 98 F | WEIGHT: 175 LBS | SYSTOLIC BLOOD PRESSURE: 146 MMHG | HEIGHT: 74 IN | BODY MASS INDEX: 22.46 KG/M2 | HEART RATE: 75 BPM | DIASTOLIC BLOOD PRESSURE: 88 MMHG | OXYGEN SATURATION: 96 %

## 2017-09-10 DIAGNOSIS — F19.10 DRUG ABUSE: Primary | ICD-10-CM

## 2017-09-10 DIAGNOSIS — S39.012A LUMBAR STRAIN, INITIAL ENCOUNTER: ICD-10-CM

## 2017-09-10 DIAGNOSIS — B00.9 HERPES: ICD-10-CM

## 2017-09-10 DIAGNOSIS — K52.9 GASTROENTERITIS: ICD-10-CM

## 2017-09-10 LAB
ALBUMIN SERPL BCP-MCNC: 4 G/DL
ALP SERPL-CCNC: 82 U/L
ALT SERPL W/O P-5'-P-CCNC: 25 U/L
AMPHET+METHAMPHET UR QL: NEGATIVE
ANION GAP SERPL CALC-SCNC: 9 MMOL/L
AST SERPL-CCNC: 26 U/L
BARBITURATES UR QL SCN>200 NG/ML: NEGATIVE
BASOPHILS # BLD AUTO: 0.03 K/UL
BASOPHILS NFR BLD: 0.3 %
BENZODIAZ UR QL SCN>200 NG/ML: NEGATIVE
BILIRUB SERPL-MCNC: 0.4 MG/DL
BILIRUB UR QL STRIP: NEGATIVE
BUN SERPL-MCNC: 10 MG/DL
BZE UR QL SCN: NORMAL
CALCIUM SERPL-MCNC: 9.5 MG/DL
CANNABINOIDS UR QL SCN: NEGATIVE
CHLORIDE SERPL-SCNC: 104 MMOL/L
CLARITY UR: CLEAR
CO2 SERPL-SCNC: 27 MMOL/L
COLOR UR: YELLOW
CREAT SERPL-MCNC: 1 MG/DL
CREAT UR-MCNC: 46.2 MG/DL
DIFFERENTIAL METHOD: ABNORMAL
EOSINOPHIL # BLD AUTO: 0.1 K/UL
EOSINOPHIL NFR BLD: 0.9 %
ERYTHROCYTE [DISTWIDTH] IN BLOOD BY AUTOMATED COUNT: 14.2 %
EST. GFR  (AFRICAN AMERICAN): >60 ML/MIN/1.73 M^2
EST. GFR  (NON AFRICAN AMERICAN): >60 ML/MIN/1.73 M^2
GLUCOSE SERPL-MCNC: 98 MG/DL
GLUCOSE UR QL STRIP: NEGATIVE
HCT VFR BLD AUTO: 39.8 %
HGB BLD-MCNC: 13 G/DL
HGB UR QL STRIP: NEGATIVE
KETONES UR QL STRIP: NEGATIVE
LEUKOCYTE ESTERASE UR QL STRIP: NEGATIVE
LYMPHOCYTES # BLD AUTO: 3.6 K/UL
LYMPHOCYTES NFR BLD: 35.3 %
MCH RBC QN AUTO: 28.5 PG
MCHC RBC AUTO-ENTMCNC: 32.7 G/DL
MCV RBC AUTO: 87 FL
METHADONE UR QL SCN>300 NG/ML: NEGATIVE
MONOCYTES # BLD AUTO: 0.9 K/UL
MONOCYTES NFR BLD: 9.2 %
NEUTROPHILS # BLD AUTO: 5.6 K/UL
NEUTROPHILS NFR BLD: 54.3 %
NITRITE UR QL STRIP: NEGATIVE
OPIATES UR QL SCN: NEGATIVE
PCP UR QL SCN>25 NG/ML: NEGATIVE
PH UR STRIP: 8 [PH] (ref 5–8)
PLATELET # BLD AUTO: 228 K/UL
PMV BLD AUTO: 9.8 FL
POTASSIUM SERPL-SCNC: 4.1 MMOL/L
PROT SERPL-MCNC: 7.1 G/DL
PROT UR QL STRIP: NEGATIVE
RBC # BLD AUTO: 4.56 M/UL
SODIUM SERPL-SCNC: 140 MMOL/L
SP GR UR STRIP: 1.01 (ref 1–1.03)
TOXICOLOGY INFORMATION: NORMAL
URN SPEC COLLECT METH UR: NORMAL
UROBILINOGEN UR STRIP-ACNC: NEGATIVE EU/DL
WBC # BLD AUTO: 10.22 K/UL

## 2017-09-10 PROCEDURE — 96360 HYDRATION IV INFUSION INIT: CPT

## 2017-09-10 PROCEDURE — 80053 COMPREHEN METABOLIC PANEL: CPT

## 2017-09-10 PROCEDURE — 63600175 PHARM REV CODE 636 W HCPCS: Performed by: NURSE PRACTITIONER

## 2017-09-10 PROCEDURE — 81003 URINALYSIS AUTO W/O SCOPE: CPT

## 2017-09-10 PROCEDURE — 96372 THER/PROPH/DIAG INJ SC/IM: CPT

## 2017-09-10 PROCEDURE — 25000003 PHARM REV CODE 250: Performed by: NURSE PRACTITIONER

## 2017-09-10 PROCEDURE — 99284 EMERGENCY DEPT VISIT MOD MDM: CPT | Mod: 25

## 2017-09-10 PROCEDURE — 85025 COMPLETE CBC W/AUTO DIFF WBC: CPT

## 2017-09-10 PROCEDURE — 80307 DRUG TEST PRSMV CHEM ANLYZR: CPT

## 2017-09-10 RX ORDER — SODIUM CHLORIDE 9 MG/ML
1000 INJECTION, SOLUTION INTRAVENOUS
Status: COMPLETED | OUTPATIENT
Start: 2017-09-10 | End: 2017-09-10

## 2017-09-10 RX ORDER — ORPHENADRINE CITRATE 100 MG/1
100 TABLET, EXTENDED RELEASE ORAL 2 TIMES DAILY
Qty: 20 TABLET | Refills: 0 | Status: SHIPPED | OUTPATIENT
Start: 2017-09-10 | End: 2017-09-20

## 2017-09-10 RX ORDER — ORPHENADRINE CITRATE 30 MG/ML
60 INJECTION INTRAMUSCULAR; INTRAVENOUS
Status: COMPLETED | OUTPATIENT
Start: 2017-09-10 | End: 2017-09-10

## 2017-09-10 RX ORDER — ACYCLOVIR 400 MG/1
400 TABLET ORAL 3 TIMES DAILY
Qty: 40 TABLET | Refills: 0 | Status: SHIPPED | OUTPATIENT
Start: 2017-09-10 | End: 2017-09-20

## 2017-09-10 RX ADMIN — ORPHENADRINE CITRATE 60 MG: 30 INJECTION INTRAMUSCULAR; INTRAVENOUS at 07:09

## 2017-09-10 RX ADMIN — SODIUM CHLORIDE 1000 ML: 0.9 INJECTION, SOLUTION INTRAVENOUS at 07:09

## 2017-09-10 NOTE — ED PROVIDER NOTES
SCRIBE #1 NOTE: I, Alex Duke, am scribing for, and in the presence of, Ralph Riojas NP. I have scribed the entire note.      History      Chief Complaint   Patient presents with    Back Pain     pt has been having diarrhea and back pain       Review of patient's allergies indicates:   Allergen Reactions    Flexeril [cyclobenzaprine]     Ibuprofen     Toradol [ketorolac] Itching    Tramadol         HPI   HPI    9/10/2017, 6:28 PM   History obtained from the patient via       History of Present Illness: Ney Jhaveri is a 32 y.o. male patient who presents to the Emergency Department for back pain which onset gradually 2 days ago after picking up a heavy object while working. Symptoms are constant and moderate in severity. No mitigating or exacerbating factors reported. Associated sxs include dizziness, subjective fever, and diarrhea. Patient denies any fever, chills, extremity weakness/numbness, saddle anesthesia, bowel/bladder incontinence, hematochezia, HA, light-headedness, vomiting, and all other sxs at this time. No prior tx reported. No further complaints or concerns at this time.       Arrival mode: Personal vehicle     PCP: Not given      Past Medical History:  Past Medical History:   Diagnosis Date    Anxiety     GERD (gastroesophageal reflux disease)     Hypertension        Past Surgical History:  History reviewed, not pertinent.    Family History:  Family History   Problem Relation Age of Onset    Hypertension Mother     Diabetes Mother     Heart disease Father     Hypertension Father     Diabetes Father        Social History:  Social History     Social History Main Topics    Smoking status: Current Every Day Smoker     Packs/day: 0.50     Types: Cigarettes    Smokeless tobacco: Never Used    Alcohol use No      Comment: quite drinking    Drug use:      Types: Cocaine    Sexual activity: Yes       ROS   Review of Systems   Constitutional: Positive for fever  (subjective). Negative for chills.   HENT: Negative for sore throat.    Respiratory: Negative for shortness of breath.    Cardiovascular: Negative for chest pain.   Gastrointestinal: Positive for diarrhea and nausea. Negative for abdominal pain, blood in stool and vomiting.        (-) bowel incontinence   Genitourinary: Negative for dysuria.        (-) bladder incontinence   Musculoskeletal: Positive for back pain.   Skin: Negative for rash.   Neurological: Positive for dizziness. Negative for weakness, light-headedness, numbness and headaches.        (-) saddle anesthesia   Hematological: Does not bruise/bleed easily.   All other systems reviewed and are negative.    Physical Exam      Initial Vitals [09/10/17 1824]   BP Pulse Resp Temp SpO2   (!) 151/84 84 16 98.2 °F (36.8 °C) 98 %      MAP       106.33          Physical Exam  Nursing Notes and Vital Signs Reviewed.  Constitutional: Patient is in no acute distress. Well-developed and well-nourished.  Head: Atraumatic. Normocephalic.  Eyes: PERRL. EOM intact. Conjunctivae are not pale. No scleral icterus.  ENT: Mucous membranes are moist.   Cardiovascular: Regular rate. Regular rhythm.   Pulmonary/Chest: No respiratory distress.   Abdominal: Soft and non-distended.   : Penis is uncircumcised. No penile discharge. Normal bilateral testicular lie and position. Scrotum and testes appear normal with no discoloration. No scrotal, testicular, or epididymal tenderness. No masses or hernias around the scrotum, testicles, or inguinal canal. Vesicular lesions to shaft of penis consistent with herpes. No canker sores.  Musculoskeletal: Moves all extremities. No obvious deformities. No edema.   Skin: Warm and dry. No rash.  Neck: Supple. No cervical midline bony tenderness, deformities, or step-offs.   Back: No spinal tenderness. No midline bony tenderness, deformities, or step-offs of the T-spine or L-spine. Skin appears normal without abrasions or bruising. No erythema,  "induration, or fluctuance. Uncomfortable sitting  Neurological: Awake and alert. Appropriate for age. Negative straight leg raise bilaterally. No strength deficit; equal and 5/5 in bilateral upper and lower extremities. No light touch sensory deficit. DTRs 2+ and equal. Normal gait. No acute focal neurological deficits noted.  Psychiatric: Normal affect. Good eye contact. Appropriate in content.    ED Course    Procedures  ED Vital Signs:  Vitals:    09/10/17 1824 09/10/17 1919   BP: (!) 151/84 (!) 146/88   Pulse: 84 75   Resp: 16    Temp: 98.2 °F (36.8 °C) 98 °F (36.7 °C)   TempSrc: Oral Oral   SpO2: 98% 96%   Weight: 79.4 kg (175 lb)    Height: 6' 2" (1.88 m)        Abnormal Lab Results:  Labs Reviewed   CBC W/ AUTO DIFFERENTIAL - Abnormal; Notable for the following:        Result Value    RBC 4.56 (*)     Hemoglobin 13.0 (*)     Hematocrit 39.8 (*)     All other components within normal limits   COMPREHENSIVE METABOLIC PANEL   URINALYSIS   DRUG SCREEN PANEL, URINE EMERGENCY        All Lab Results:  Results for orders placed or performed during the hospital encounter of 09/10/17   CBC auto differential   Result Value Ref Range    WBC 10.22 3.90 - 12.70 K/uL    RBC 4.56 (L) 4.60 - 6.20 M/uL    Hemoglobin 13.0 (L) 14.0 - 18.0 g/dL    Hematocrit 39.8 (L) 40.0 - 54.0 %    MCV 87 82 - 98 fL    MCH 28.5 27.0 - 31.0 pg    MCHC 32.7 32.0 - 36.0 g/dL    RDW 14.2 11.5 - 14.5 %    Platelets 228 150 - 350 K/uL    MPV 9.8 9.2 - 12.9 fL    Gran # 5.6 1.8 - 7.7 K/uL    Lymph # 3.6 1.0 - 4.8 K/uL    Mono # 0.9 0.3 - 1.0 K/uL    Eos # 0.1 0.0 - 0.5 K/uL    Baso # 0.03 0.00 - 0.20 K/uL    Gran% 54.3 38.0 - 73.0 %    Lymph% 35.3 18.0 - 48.0 %    Mono% 9.2 4.0 - 15.0 %    Eosinophil% 0.9 0.0 - 8.0 %    Basophil% 0.3 0.0 - 1.9 %    Differential Method Automated    Comprehensive metabolic panel   Result Value Ref Range    Sodium 140 136 - 145 mmol/L    Potassium 4.1 3.5 - 5.1 mmol/L    Chloride 104 95 - 110 mmol/L    CO2 27 23 - 29 " mmol/L    Glucose 98 70 - 110 mg/dL    BUN, Bld 10 6 - 20 mg/dL    Creatinine 1.0 0.5 - 1.4 mg/dL    Calcium 9.5 8.7 - 10.5 mg/dL    Total Protein 7.1 6.0 - 8.4 g/dL    Albumin 4.0 3.5 - 5.2 g/dL    Total Bilirubin 0.4 0.1 - 1.0 mg/dL    Alkaline Phosphatase 82 55 - 135 U/L    AST 26 10 - 40 U/L    ALT 25 10 - 44 U/L    Anion Gap 9 8 - 16 mmol/L    eGFR if African American >60 >60 mL/min/1.73 m^2    eGFR if non African American >60 >60 mL/min/1.73 m^2   Urinalysis   Result Value Ref Range    Specimen UA Urine, Clean Catch     Color, UA Yellow Yellow, Straw, Mariely    Appearance, UA Clear Clear    pH, UA 8.0 5.0 - 8.0    Specific Gravity, UA 1.010 1.005 - 1.030    Protein, UA Negative Negative    Glucose, UA Negative Negative    Ketones, UA Negative Negative    Bilirubin (UA) Negative Negative    Occult Blood UA Negative Negative    Nitrite, UA Negative Negative    Urobilinogen, UA Negative <2.0 EU/dL    Leukocytes, UA Negative Negative   Drug screen panel, emergency   Result Value Ref Range    Benzodiazepines Negative     Methadone metabolites Negative     Cocaine (Metab.) Presumptive Positive     Opiate Scrn, Ur Negative     Barbiturate Screen, Ur Negative     Amphetamine Screen, Ur Negative     THC Negative     Phencyclidine Negative     Creatinine, Random Ur 46.2 23.0 - 375.0 mg/dL    Toxicology Information SEE COMMENT        Imaging Results:  Imaging Results          X-Ray Lumbar Spine Ap And Lateral (Final result)  Result time 09/10/17 19:07:40    Final result by Sreekanth Alfonso MD (09/10/17 19:07:40)                 Impression:        1.  Negative for acute process involving the lumbar spine.  2.  Numerous stable findings as noted above.  This includes bilateral L5 pars defects without subluxation.      Electronically signed by: SREEKANTH ALFONSO MD  Date:     09/10/17  Time:    19:07              Narrative:    Lumbosacral spine x-ray, 3 views    Clinical Indication: Low back pain.     Findings:  Comparison to made  to an abdomen and pelvis CT scan from 08/25/2017.   There are 5 weight bearing lumbar vertebra. Stable bilateral L5 pars interarticularis defects, without subluxation.  Stable multilevel Schmorl node formation.  The vertebral body heights and intervertebral disc heights are   well-maintained. Negative for spondylolysis or spondylolisthesis. The sacral ala and sacroiliac joints are intact. The bowel gas pattern is normal.                                      The Emergency Provider reviewed the vital signs and test results, which are outlined above.    ED Discussion     9:37 PM: Reassessed pt at this time.  Pt is resting comfortably, in NAD, and VSS at this time. Pt mentioned lesions on his penis at this time and a physical exam of his genitals was performed. Results were added to the original physical exam. Discussed with pt all pertinent ED information and results. Discussed pt dx and plan of tx. Gave pt all f/u and return to the ED instructions. All questions and concerns were addressed at this time. Pt expresses understanding of information and instructions, and is comfortable with plan to discharge. Pt is stable for discharge.    I discussed with patient and/or family/caretaker that evaluation in the ED does not suggest any emergent or life threatening medical conditions requiring immediate intervention beyond what was provided in the ED, and I believe patient is safe for discharge.  Regardless, an unremarkable evaluation in the ED does not preclude the development or presence of a serious of life threatening condition. As such, patient was instructed to return immediately for any worsening or change in current symptoms.      ED Medication(s):  Medications   0.9%  NaCl infusion (1,000 mLs Intravenous New Bag 9/10/17 1942)   orphenadrine injection 60 mg (60 mg Intramuscular Given 9/10/17 1947)       New Prescriptions    ORPHENADRINE (NORFLEX) 100 MG TABLET    Take 1 tablet (100 mg total) by mouth 2 (two) times  daily.       Follow-up Information     [facility] In 2 days.    Contact information:  [address] [facility] [address] [phone]                     Medical Decision Making    Medical Decision Making:   Clinical Tests:   Lab Tests: Ordered and Reviewed  Radiological Study: Reviewed and Ordered           Scribe Attestation:   Scribe #1: I performed the above scribed service and the documentation accurately describes the services I performed. I attest to the accuracy of the note.    Attending:   Physician Attestation Statement for Scribe #1: I, Ralph Riojas NP, personally performed the services described in this documentation, as scribed by Alex Duke, in my presence, and it is both accurate and complete.          Clinical Impression       ICD-10-CM ICD-9-CM   1. Drug abuse F19.10 305.90   2. Gastroenteritis K52.9 558.9   3. Lumbar strain, initial encounter S39.012A 847.2     herpes  Disposition:   Disposition: Discharged  Condition: Stable         Ralph Riojas NP  09/11/17 0043

## 2017-09-11 NOTE — ED NOTES
Pt noted walking in hallway back to room. Pt's arm check for IV, IV was still in place. IV was removed at this time.

## 2017-09-11 NOTE — ED NOTES
Went into room to removed IV but pt was not in room. Pt's wife stated he went out to the car to get something. When I stated he needed to come back because he had an IV she stated that he had already removed it. Provided notified.

## 2017-12-06 ENCOUNTER — HOSPITAL ENCOUNTER (EMERGENCY)
Facility: HOSPITAL | Age: 32
Discharge: HOME | End: 2017-12-07
Attending: EMERGENCY MEDICINE

## 2017-12-06 DIAGNOSIS — R07.9 CHEST PAIN AT REST: ICD-10-CM

## 2017-12-06 DIAGNOSIS — K29.70 GASTRITIS, PRESENCE OF BLEEDING UNSPECIFIED, UNSPECIFIED CHRONICITY, UNSPECIFIED GASTRITIS TYPE: ICD-10-CM

## 2017-12-06 DIAGNOSIS — R10.13 EPIGASTRIC PAIN: Primary | ICD-10-CM

## 2017-12-06 DIAGNOSIS — F19.10 DRUG ABUSE: ICD-10-CM

## 2017-12-06 LAB
ALBUMIN SERPL BCP-MCNC: 4.1 G/DL
ALP SERPL-CCNC: 72 U/L
ALT SERPL W/O P-5'-P-CCNC: 31 U/L
AMPHET+METHAMPHET UR QL: NEGATIVE
ANION GAP SERPL CALC-SCNC: 8 MMOL/L
AST SERPL-CCNC: 21 U/L
BARBITURATES UR QL SCN>200 NG/ML: NEGATIVE
BASOPHILS # BLD AUTO: 0.01 K/UL
BASOPHILS NFR BLD: 0.1 %
BENZODIAZ UR QL SCN>200 NG/ML: NORMAL
BILIRUB SERPL-MCNC: 0.2 MG/DL
BILIRUB UR QL STRIP: NEGATIVE
BUN SERPL-MCNC: 14 MG/DL
BZE UR QL SCN: NORMAL
CALCIUM SERPL-MCNC: 9.5 MG/DL
CANNABINOIDS UR QL SCN: NEGATIVE
CHLORIDE SERPL-SCNC: 104 MMOL/L
CLARITY UR: CLEAR
CO2 SERPL-SCNC: 27 MMOL/L
COLOR UR: YELLOW
CREAT SERPL-MCNC: 1.1 MG/DL
CREAT UR-MCNC: 221.9 MG/DL
DIFFERENTIAL METHOD: ABNORMAL
EOSINOPHIL # BLD AUTO: 0.1 K/UL
EOSINOPHIL NFR BLD: 1.3 %
ERYTHROCYTE [DISTWIDTH] IN BLOOD BY AUTOMATED COUNT: 12.9 %
EST. GFR  (AFRICAN AMERICAN): >60 ML/MIN/1.73 M^2
EST. GFR  (NON AFRICAN AMERICAN): >60 ML/MIN/1.73 M^2
ETHANOL SERPL-MCNC: <10 MG/DL
GLUCOSE SERPL-MCNC: 95 MG/DL
GLUCOSE UR QL STRIP: NEGATIVE
HCT VFR BLD AUTO: 43.8 %
HGB BLD-MCNC: 14.7 G/DL
HGB UR QL STRIP: NEGATIVE
KETONES UR QL STRIP: ABNORMAL
LEUKOCYTE ESTERASE UR QL STRIP: NEGATIVE
LIPASE SERPL-CCNC: 21 U/L
LYMPHOCYTES # BLD AUTO: 2.1 K/UL
LYMPHOCYTES NFR BLD: 19.5 %
MCH RBC QN AUTO: 28.6 PG
MCHC RBC AUTO-ENTMCNC: 33.6 G/DL
MCV RBC AUTO: 85 FL
METHADONE UR QL SCN>300 NG/ML: NEGATIVE
MONOCYTES # BLD AUTO: 0.5 K/UL
MONOCYTES NFR BLD: 4.6 %
NEUTROPHILS # BLD AUTO: 7.9 K/UL
NEUTROPHILS NFR BLD: 74.5 %
NITRITE UR QL STRIP: NEGATIVE
OPIATES UR QL SCN: NORMAL
PCP UR QL SCN>25 NG/ML: NEGATIVE
PH UR STRIP: 6 [PH] (ref 5–8)
PLATELET # BLD AUTO: 268 K/UL
PMV BLD AUTO: 10.8 FL
POTASSIUM SERPL-SCNC: 3.9 MMOL/L
PROT SERPL-MCNC: 7.4 G/DL
PROT UR QL STRIP: NEGATIVE
RBC # BLD AUTO: 5.14 M/UL
SODIUM SERPL-SCNC: 139 MMOL/L
SP GR UR STRIP: >=1.03 (ref 1–1.03)
TOXICOLOGY INFORMATION: NORMAL
URN SPEC COLLECT METH UR: ABNORMAL
UROBILINOGEN UR STRIP-ACNC: NEGATIVE EU/DL
WBC # BLD AUTO: 10.58 K/UL

## 2017-12-06 PROCEDURE — 96361 HYDRATE IV INFUSION ADD-ON: CPT

## 2017-12-06 PROCEDURE — 83690 ASSAY OF LIPASE: CPT

## 2017-12-06 PROCEDURE — 80053 COMPREHEN METABOLIC PANEL: CPT

## 2017-12-06 PROCEDURE — 96365 THER/PROPH/DIAG IV INF INIT: CPT

## 2017-12-06 PROCEDURE — 25500020 PHARM REV CODE 255: Performed by: EMERGENCY MEDICINE

## 2017-12-06 PROCEDURE — 99285 EMERGENCY DEPT VISIT HI MDM: CPT | Mod: 25

## 2017-12-06 PROCEDURE — 80320 DRUG SCREEN QUANTALCOHOLS: CPT

## 2017-12-06 PROCEDURE — 80307 DRUG TEST PRSMV CHEM ANLYZR: CPT

## 2017-12-06 PROCEDURE — 25000003 PHARM REV CODE 250: Performed by: EMERGENCY MEDICINE

## 2017-12-06 PROCEDURE — 81003 URINALYSIS AUTO W/O SCOPE: CPT

## 2017-12-06 PROCEDURE — 85025 COMPLETE CBC W/AUTO DIFF WBC: CPT

## 2017-12-06 PROCEDURE — 96375 TX/PRO/DX INJ NEW DRUG ADDON: CPT

## 2017-12-06 PROCEDURE — 96376 TX/PRO/DX INJ SAME DRUG ADON: CPT

## 2017-12-06 PROCEDURE — 63600175 PHARM REV CODE 636 W HCPCS: Performed by: EMERGENCY MEDICINE

## 2017-12-06 RX ORDER — MORPHINE SULFATE 2 MG/ML
4 INJECTION, SOLUTION INTRAMUSCULAR; INTRAVENOUS
Status: COMPLETED | OUTPATIENT
Start: 2017-12-06 | End: 2017-12-06

## 2017-12-06 RX ORDER — ONDANSETRON 2 MG/ML
4 INJECTION INTRAMUSCULAR; INTRAVENOUS
Status: COMPLETED | OUTPATIENT
Start: 2017-12-06 | End: 2017-12-06

## 2017-12-06 RX ORDER — MORPHINE SULFATE 2 MG/ML
6 INJECTION, SOLUTION INTRAMUSCULAR; INTRAVENOUS
Status: COMPLETED | OUTPATIENT
Start: 2017-12-06 | End: 2017-12-06

## 2017-12-06 RX ADMIN — Medication 6 MG: at 07:12

## 2017-12-06 RX ADMIN — Medication 4 MG: at 10:12

## 2017-12-06 RX ADMIN — PROMETHAZINE HYDROCHLORIDE 12.5 MG: 25 INJECTION INTRAMUSCULAR; INTRAVENOUS at 11:12

## 2017-12-06 RX ADMIN — SODIUM CHLORIDE 1000 ML: 0.9 INJECTION, SOLUTION INTRAVENOUS at 07:12

## 2017-12-06 RX ADMIN — LIDOCAINE HYDROCHLORIDE 50 ML: 20 SOLUTION ORAL; TOPICAL at 10:12

## 2017-12-06 RX ADMIN — IOHEXOL 30 ML: 350 INJECTION, SOLUTION INTRAVENOUS at 10:12

## 2017-12-06 RX ADMIN — ONDANSETRON HYDROCHLORIDE 4 MG: 2 INJECTION, SOLUTION INTRAMUSCULAR; INTRAVENOUS at 07:12

## 2017-12-07 VITALS
WEIGHT: 185 LBS | BODY MASS INDEX: 23.74 KG/M2 | RESPIRATION RATE: 20 BRPM | TEMPERATURE: 98 F | SYSTOLIC BLOOD PRESSURE: 112 MMHG | HEART RATE: 64 BPM | HEIGHT: 74 IN | DIASTOLIC BLOOD PRESSURE: 65 MMHG | OXYGEN SATURATION: 97 %

## 2017-12-07 PROCEDURE — 25500020 PHARM REV CODE 255: Performed by: EMERGENCY MEDICINE

## 2017-12-07 RX ORDER — FAMOTIDINE 20 MG/1
20 TABLET, FILM COATED ORAL 2 TIMES DAILY
Qty: 20 TABLET | Refills: 0 | Status: SHIPPED | OUTPATIENT
Start: 2017-12-07 | End: 2018-12-07

## 2017-12-07 RX ORDER — ONDANSETRON 4 MG/1
4 TABLET, FILM COATED ORAL EVERY 6 HOURS
Qty: 20 TABLET | Refills: 0 | Status: SHIPPED | OUTPATIENT
Start: 2017-12-07

## 2017-12-07 RX ORDER — DICYCLOMINE HYDROCHLORIDE 20 MG/1
20 TABLET ORAL 2 TIMES DAILY
Qty: 20 TABLET | Refills: 0 | Status: SHIPPED | OUTPATIENT
Start: 2017-12-07 | End: 2018-01-06

## 2017-12-07 RX ADMIN — IOHEXOL 75 ML: 350 INJECTION, SOLUTION INTRAVENOUS at 12:12

## 2017-12-07 NOTE — ED PROVIDER NOTES
SCRIBE #1 NOTE: I, Anita Loree, am scribing for, and in the presence of, Edda Granda Do, MD. I have scribed the entire note.      History      Chief Complaint   Patient presents with    Abdominal Pain     vomiting       Review of patient's allergies indicates:   Allergen Reactions    Flexeril [cyclobenzaprine]     Ibuprofen     Toradol [ketorolac] Itching    Tramadol         HPI   HPI    12/6/2017, 6:44 PM   History obtained from patient and partner      History of Present Illness: Ney Jhaveri is a 32 y.o. male patient who presents to the Emergency Department for N/V which onset suddenly 4 days PTA. Symptoms are episodic and moderate in severity. No mitigating or exacerbating factors reported. Associated sxs include abd pain, syncope, light-headedness, HA, and fever (Tmax 101.9). Pt has had 2 syncopal episodes today per pt's partner. Patient/partner denies any head trauma, extremity weakness, numbness, confusion, diarrhea, hematochezia, constipation, urinary sxs, CP, SOB, and all other sxs at this time. Prior Tx includes Zofran and Tylenol with Temporary relief. Partner states pt took Benadryl PTA. No further complaints or concerns at this time.       Arrival mode: Personal vehicle      PCP: Primary Doctor No       Past Medical History:  Past Medical History:   Diagnosis Date    Anxiety     GERD (gastroesophageal reflux disease)     Hypertension        Past Surgical History:  History reviewed. No pertinent surgical history.    Family History:  Family History   Problem Relation Age of Onset    Hypertension Mother     Diabetes Mother     Heart disease Father     Hypertension Father     Diabetes Father        Social History:  Social History     Social History Main Topics    Smoking status: Current Every Day Smoker     Packs/day: 0.50     Types: Cigarettes    Smokeless tobacco: Never Used    Alcohol use No      Comment: quite drinking    Drug use:      Types: Cocaine    Sexual activity: Yes        ROS   Review of Systems   Constitutional: Positive for fever (Tmax 101.9).   HENT: Negative for sore throat.    Respiratory: Negative for shortness of breath.    Cardiovascular: Negative for chest pain.   Gastrointestinal: Positive for abdominal pain, nausea and vomiting. Negative for blood in stool, constipation and diarrhea.   Genitourinary: Negative for dysuria, frequency and hematuria.   Musculoskeletal: Negative for back pain.   Skin: Negative for rash.   Neurological: Positive for syncope, light-headedness and headaches. Negative for weakness and numbness.        (-) Head trauma   Hematological: Does not bruise/bleed easily.   Psychiatric/Behavioral: Negative for confusion.   All other systems reviewed and are negative.      Physical Exam      Initial Vitals [12/06/17 1841]   BP Pulse Resp Temp SpO2   (!) 157/99 70 18 98 °F (36.7 °C) 99 %      MAP       118.33          Physical Exam  Nursing Notes and Vital Signs Reviewed.  Constitutional: Patient is in no acute distress. Well-developed and well-nourished.  Head: Atraumatic. Normocephalic.  Eyes: PERRL. EOM intact. Conjunctivae are not pale. No scleral icterus.  ENT: Mucous membranes are moist. Oropharynx is clear and symmetric.    Neck: Supple. Full ROM. No lymphadenopathy.  Cardiovascular: Regular rate. Regular rhythm. No murmurs, rubs, or gallops. Distal pulses are 2+ and symmetric.  Pulmonary/Chest: No respiratory distress. Clear to auscultation bilaterally. No wheezing or rales.  Abdominal: Soft and non-distended.  There is LLQ tenderness.  No rebound, guarding, or rigidity. Good bowel sounds.  Musculoskeletal: Moves all extremities. No obvious deformities. No edema. No calf tenderness.  Skin: Warm and dry.  Neurological: Pt is awake but appears drowsy, most likely from Benadryl. Normal speech.  No acute focal neurological deficits are appreciated.  Psychiatric: Normal affect. Good eye contact. Appropriate in content.    ED Course    Procedures  ED  "Vital Signs:  Vitals:    12/06/17 1841 12/06/17 2023 12/06/17 2230 12/06/17 2355   BP: (!) 157/99 (!) 140/74 (!) 146/89 128/71   Pulse: 70 81 71 64   Resp: 18  20 20   Temp: 98 °F (36.7 °C) 97.7 °F (36.5 °C)     TempSrc:  Oral     SpO2: 99% 99% 99% 98%   Weight: 83.9 kg (185 lb)      Height: 6' 2" (1.88 m)       12/07/17 0053   BP: 112/65   Pulse:    Resp:    Temp:    TempSrc:    SpO2: 97%   Weight:    Height:        Abnormal Lab Results:  Labs Reviewed   CBC W/ AUTO DIFFERENTIAL - Abnormal; Notable for the following:        Result Value    Gran # 7.9 (*)     Gran% 74.5 (*)     All other components within normal limits   URINALYSIS - Abnormal; Notable for the following:     Specific Gravity, UA >=1.030 (*)     Ketones, UA 1+ (*)     All other components within normal limits   COMPREHENSIVE METABOLIC PANEL   LIPASE   DRUG SCREEN PANEL, URINE EMERGENCY   ALCOHOL,MEDICAL (ETHANOL)        All Lab Results:  Results for orders placed or performed during the hospital encounter of 12/06/17   CBC W/ AUTO DIFFERENTIAL   Result Value Ref Range    WBC 10.58 3.90 - 12.70 K/uL    RBC 5.14 4.60 - 6.20 M/uL    Hemoglobin 14.7 14.0 - 18.0 g/dL    Hematocrit 43.8 40.0 - 54.0 %    MCV 85 82 - 98 fL    MCH 28.6 27.0 - 31.0 pg    MCHC 33.6 32.0 - 36.0 g/dL    RDW 12.9 11.5 - 14.5 %    Platelets 268 150 - 350 K/uL    MPV 10.8 9.2 - 12.9 fL    Gran # 7.9 (H) 1.8 - 7.7 K/uL    Lymph # 2.1 1.0 - 4.8 K/uL    Mono # 0.5 0.3 - 1.0 K/uL    Eos # 0.1 0.0 - 0.5 K/uL    Baso # 0.01 0.00 - 0.20 K/uL    Gran% 74.5 (H) 38.0 - 73.0 %    Lymph% 19.5 18.0 - 48.0 %    Mono% 4.6 4.0 - 15.0 %    Eosinophil% 1.3 0.0 - 8.0 %    Basophil% 0.1 0.0 - 1.9 %    Differential Method Automated    Comp. Metabolic Panel   Result Value Ref Range    Sodium 139 136 - 145 mmol/L    Potassium 3.9 3.5 - 5.1 mmol/L    Chloride 104 95 - 110 mmol/L    CO2 27 23 - 29 mmol/L    Glucose 95 70 - 110 mg/dL    BUN, Bld 14 6 - 20 mg/dL    Creatinine 1.1 0.5 - 1.4 mg/dL    Calcium 9.5 " 8.7 - 10.5 mg/dL    Total Protein 7.4 6.0 - 8.4 g/dL    Albumin 4.1 3.5 - 5.2 g/dL    Total Bilirubin 0.2 0.1 - 1.0 mg/dL    Alkaline Phosphatase 72 55 - 135 U/L    AST 21 10 - 40 U/L    ALT 31 10 - 44 U/L    Anion Gap 8 8 - 16 mmol/L    eGFR if African American >60 >60 mL/min/1.73 m^2    eGFR if non African American >60 >60 mL/min/1.73 m^2   Lipase   Result Value Ref Range    Lipase 21 4 - 60 U/L   Urinalysis - Clean Catch   Result Value Ref Range    Specimen UA Urine, Clean Catch     Color, UA Yellow Yellow, Straw, Mariely    Appearance, UA Clear Clear    pH, UA 6.0 5.0 - 8.0    Specific Gravity, UA >=1.030 (A) 1.005 - 1.030    Protein, UA Negative Negative    Glucose, UA Negative Negative    Ketones, UA 1+ (A) Negative    Bilirubin (UA) Negative Negative    Occult Blood UA Negative Negative    Nitrite, UA Negative Negative    Urobilinogen, UA Negative <2.0 EU/dL    Leukocytes, UA Negative Negative   Drug screen panel, emergency   Result Value Ref Range    Benzodiazepines Presumptive Positive     Methadone metabolites Negative     Cocaine (Metab.) Presumptive Positive     Opiate Scrn, Ur Presumptive Positive     Barbiturate Screen, Ur Negative     Amphetamine Screen, Ur Negative     THC Negative     Phencyclidine Negative     Creatinine, Random Ur 221.9 23.0 - 375.0 mg/dL    Toxicology Information SEE COMMENT    Ethanol   Result Value Ref Range    Alcohol, Medical, Serum <10 <10 mg/dL         Imaging Results:  Imaging Results          CT Abdomen Pelvis With Contrast (In process)                X-Ray Chest 1 View (Final result)  Result time 12/06/17 19:35:24    Final result by CATIE Caro Sr., MD (12/06/17 19:35:24)                 Impression:      Normal study.      Electronically signed by: CATIE CARO MD  Date:     12/06/17  Time:    19:35              Narrative:    One-view chest x-ray    Clinical History: Chest pain    Finding: Comparison was made to a prior examination performed on 12/7/2016. The size of  the heart is normal. The lungs are clear. There is no pneumothorax.  The costophrenic angles are sharp.                             1:42 AM: Per STAT radiology, pt's CT results: No acute process to explain symptoms.  Right renal hypodensity is too small to characterize, but most likely cyst. L5 spondylosis.            The Emergency Provider reviewed the vital signs and test results, which are outlined above.    ED Discussion     1:45 AM: Reassessed pt at this time. Discussed with pt all pertinent ED information and results. Discussed pt dx and plan of tx. Advised pt to take Bentyl, Zofran, and Pecid for symptomatic Tx. Gave pt all f/u and return to the ED instructions. All questions and concerns were addressed at this time. Pt expresses understanding of information and instructions, and is comfortable with plan to discharge. Pt is stable for discharge.    I discussed with patient and/or family/caretaker that evaluation in the ED does not suggest any emergent or life threatening medical conditions requiring immediate intervention beyond what was provided in the ED, and I believe patient is safe for discharge.  Regardless, an unremarkable evaluation in the ED does not preclude the development or presence of a serious of life threatening condition. As such, patient was instructed to return immediately for any worsening or change in current symptoms.      ED Medication(s):  Medications   sodium chloride 0.9% bolus 1,000 mL (0 mLs Intravenous Stopped 12/6/17 2128)   morphine injection 6 mg (6 mg Intravenous Given 12/6/17 1924)   ondansetron injection 4 mg (4 mg Intravenous Given 12/6/17 1923)   GI cocktail (mylanta 30 mL, lidocaine 2 % viscous 10 mL, dicyclomine 10 mL) 50 mL (50 mLs Oral Given 12/6/17 2257)   morphine injection 4 mg (4 mg Intravenous Given 12/6/17 2259)   promethazine (PHENERGAN) 12.5 mg in dextrose 5 % 50 mL IVPB (0 mg Intravenous Stopped 12/6/17 2323)   omnipaque 350 iohexol 30 mL (30 mLs Oral Given  12/6/17 8850)   omnipaque 350 iohexol 75 mL (75 mLs Intravenous Given 12/7/17 0045)       New Prescriptions    DICYCLOMINE (BENTYL) 20 MG TABLET    Take 1 tablet (20 mg total) by mouth 2 (two) times daily.       Follow-up Information     [facility] In 2 days.    Contact information:  [address] [facility] [address] [phone]                     Medical Decision Making    Medical Decision Making:   Clinical Tests:   Lab Tests: Ordered and Reviewed  Radiological Study: Reviewed and Ordered           Scribe Attestation:   Scribe #1: I performed the above scribed service and the documentation accurately describes the services I performed. I attest to the accuracy of the note.    Attending:   Physician Attestation Statement for Scribe #1: I, Edda Granda Do, MD, personally performed the services described in this documentation, as scribed by Anita Ralph, in my presence, and it is both accurate and complete.          Clinical Impression       ICD-10-CM ICD-9-CM   1. Epigastric pain R10.13 789.06   2. Chest pain at rest R07.9 786.50   3. Drug abuse F19.10 305.90   4. Gastritis, presence of bleeding unspecified, unspecified chronicity, unspecified gastritis type K29.70 535.50       Disposition:   Disposition: Discharged  Condition: Stable         Edda Granda Do, MD  12/07/17 0149

## 2017-12-08 ENCOUNTER — HOSPITAL ENCOUNTER (EMERGENCY)
Facility: HOSPITAL | Age: 32
Discharge: HOME | End: 2017-12-08

## 2017-12-08 VITALS
BODY MASS INDEX: 22.81 KG/M2 | RESPIRATION RATE: 16 BRPM | WEIGHT: 177.69 LBS | HEART RATE: 82 BPM | TEMPERATURE: 99 F | DIASTOLIC BLOOD PRESSURE: 80 MMHG | SYSTOLIC BLOOD PRESSURE: 146 MMHG | OXYGEN SATURATION: 97 %

## 2017-12-08 DIAGNOSIS — H72.91 OTITIS MEDIA OF RIGHT EAR WITH RUPTURE OF TYMPANIC MEMBRANE: Primary | ICD-10-CM

## 2017-12-08 DIAGNOSIS — M62.830 BACK SPASM: ICD-10-CM

## 2017-12-08 DIAGNOSIS — Z76.0 MEDICATION REFILL: ICD-10-CM

## 2017-12-08 DIAGNOSIS — H66.91 OTITIS MEDIA OF RIGHT EAR WITH RUPTURE OF TYMPANIC MEMBRANE: Primary | ICD-10-CM

## 2017-12-08 PROCEDURE — 99283 EMERGENCY DEPT VISIT LOW MDM: CPT

## 2017-12-08 RX ORDER — AMOXICILLIN AND CLAVULANATE POTASSIUM 875; 125 MG/1; MG/1
1 TABLET, FILM COATED ORAL 2 TIMES DAILY
Qty: 14 TABLET | Refills: 0 | Status: SHIPPED | OUTPATIENT
Start: 2017-12-08

## 2017-12-08 RX ORDER — CIPROFLOXACIN 0.5 MG/.25ML
0.25 SOLUTION/ DROPS AURICULAR (OTIC) 2 TIMES DAILY
Qty: 20 VIAL | Refills: 0 | Status: SHIPPED | OUTPATIENT
Start: 2017-12-08 | End: 2017-12-18

## 2017-12-08 RX ORDER — METHOCARBAMOL 500 MG/1
1000 TABLET, FILM COATED ORAL 3 TIMES DAILY
Qty: 30 TABLET | Refills: 0 | Status: SHIPPED | OUTPATIENT
Start: 2017-12-08 | End: 2017-12-13

## 2017-12-08 RX ORDER — METHYLPREDNISOLONE 4 MG/1
TABLET ORAL
Qty: 1 PACKAGE | Refills: 0 | Status: SHIPPED | OUTPATIENT
Start: 2017-12-08

## 2017-12-08 RX ORDER — CLONIDINE HYDROCHLORIDE 0.1 MG/1
0.2 TABLET ORAL 2 TIMES DAILY
Qty: 30 TABLET | Refills: 2 | Status: SHIPPED | OUTPATIENT
Start: 2017-12-08 | End: 2018-12-08

## 2017-12-08 NOTE — ED PROVIDER NOTES
Encounter Date: 12/8/2017       History     Chief Complaint   Patient presents with    Headache     began several days ago    Ear Drainage     pt reports blood draining from ear beginning today     The history is provided by the patient.   Otalgia   This is a new problem. The current episode started today. There is pain in the right ear. The problem occurs rarely. The problem has been unchanged. The pain is at a severity of 4/10. Associated symptoms include ear discharge and headaches. Pertinent negatives include no hearing loss, no rhinorrhea, no sore throat, no abdominal pain, no diarrhea, no vomiting, no neck pain, no cough and no rash.   Back Pain    This is a recurrent problem. The current episode started several weeks ago. The problem occurs daily. The problem has been unchanged. The pain is associated with no known injury. The pain is present in the lumbar spine. The quality of the pain is described as aching. The pain does not radiate. The pain is at a severity of 5/10. The symptoms are aggravated by twisting and bending. Associated symptoms include headaches. Pertinent negatives include no chest pain, no fever, no numbness, no weight loss, no abdominal pain, no abdominal swelling, no bowel incontinence, no perianal numbness, no bladder incontinence, no dysuria, no pelvic pain, no leg pain, no paresthesias, no paresis, no tingling and no weakness. He has tried nothing for the symptoms.     Review of patient's allergies indicates:   Allergen Reactions    Flexeril [cyclobenzaprine]     Ibuprofen     Toradol [ketorolac] Itching    Tramadol      Past Medical History:   Diagnosis Date    Anxiety     GERD (gastroesophageal reflux disease)     Hypertension      No past surgical history on file.  Family History   Problem Relation Age of Onset    Hypertension Mother     Diabetes Mother     Heart disease Father     Hypertension Father     Diabetes Father      Social History   Substance Use Topics     Smoking status: Current Every Day Smoker     Packs/day: 0.50     Types: Cigarettes    Smokeless tobacco: Never Used    Alcohol use No      Comment: quite drinking     Review of Systems   Constitutional: Negative for diaphoresis, fever and weight loss.   HENT: Positive for ear discharge and ear pain. Negative for hearing loss, rhinorrhea and sore throat.    Eyes: Negative for photophobia and redness.   Respiratory: Negative for cough and shortness of breath.    Cardiovascular: Negative for chest pain.   Gastrointestinal: Negative for abdominal pain, bowel incontinence, constipation, diarrhea, nausea and vomiting.   Endocrine: Negative for polydipsia and polyphagia.   Genitourinary: Negative for bladder incontinence, dysuria, frequency and pelvic pain.   Musculoskeletal: Positive for back pain. Negative for neck pain.   Skin: Negative for rash.   Neurological: Positive for headaches. Negative for tingling, weakness, numbness and paresthesias.   Hematological: Does not bruise/bleed easily.   Psychiatric/Behavioral: The patient is not nervous/anxious.    All other systems reviewed and are negative.      Physical Exam     Initial Vitals [12/08/17 1525]   BP Pulse Resp Temp SpO2   (!) 146/80 82 16 98.6 °F (37 °C) 97 %      MAP       102         Physical Exam    Nursing note and vitals reviewed.  Constitutional: He appears well-developed and well-nourished.   HENT:   Head: Normocephalic and atraumatic.   Right Ear: External ear normal. Tympanic membrane is perforated. There is hemotympanum.   Left Ear: External ear normal.   Nose: Nose normal.   Mouth/Throat: Oropharynx is clear and moist.   Eyes: Conjunctivae and EOM are normal. Pupils are equal, round, and reactive to light.   Neck: Normal range of motion. Neck supple.   Cardiovascular: Normal rate, regular rhythm, normal heart sounds and intact distal pulses.   Pulmonary/Chest: Breath sounds normal. No respiratory distress. He has no wheezes. He has no rhonchi. He has  no rales.   Abdominal: Soft. Bowel sounds are normal. He exhibits no distension. There is no tenderness. There is no rebound and no guarding.   Musculoskeletal:        Lumbar back: He exhibits decreased range of motion, tenderness, pain and spasm. He exhibits no bony tenderness.        Back:    Neurological: He is alert and oriented to person, place, and time. He has normal strength.   Skin: Skin is warm and dry.   Psychiatric: He has a normal mood and affect. His behavior is normal. Judgment and thought content normal.         ED Course   Procedures  Labs Reviewed - No data to display                            ED Course      Clinical Impression:   The primary encounter diagnosis was Otitis media of right ear with rupture of tympanic membrane. Diagnoses of Back spasm and Medication refill were also pertinent to this visit.    Disposition:   Disposition: Discharged  Condition: Stable                        LASHAUN Mead  12/08/17 1542

## 2017-12-30 ENCOUNTER — HOSPITAL ENCOUNTER (EMERGENCY)
Facility: HOSPITAL | Age: 32
Discharge: HOME | End: 2017-12-30

## 2017-12-30 VITALS
HEIGHT: 72 IN | RESPIRATION RATE: 19 BRPM | OXYGEN SATURATION: 97 % | BODY MASS INDEX: 23.42 KG/M2 | HEART RATE: 80 BPM | SYSTOLIC BLOOD PRESSURE: 159 MMHG | WEIGHT: 172.94 LBS | DIASTOLIC BLOOD PRESSURE: 73 MMHG | TEMPERATURE: 99 F

## 2017-12-30 DIAGNOSIS — J06.9 UPPER RESPIRATORY TRACT INFECTION, UNSPECIFIED TYPE: Primary | ICD-10-CM

## 2017-12-30 LAB
ALBUMIN SERPL BCP-MCNC: 3.9 G/DL
ALP SERPL-CCNC: 75 U/L
ALT SERPL W/O P-5'-P-CCNC: 21 U/L
ANION GAP SERPL CALC-SCNC: 8 MMOL/L
AST SERPL-CCNC: 20 U/L
BASOPHILS # BLD AUTO: 0.03 K/UL
BASOPHILS NFR BLD: 0.4 %
BILIRUB SERPL-MCNC: 0.3 MG/DL
BUN SERPL-MCNC: 17 MG/DL
CALCIUM SERPL-MCNC: 9.1 MG/DL
CHLORIDE SERPL-SCNC: 106 MMOL/L
CO2 SERPL-SCNC: 25 MMOL/L
CREAT SERPL-MCNC: 1.1 MG/DL
DEPRECATED S PYO AG THROAT QL EIA: NEGATIVE
DIFFERENTIAL METHOD: NORMAL
EOSINOPHIL # BLD AUTO: 0.2 K/UL
EOSINOPHIL NFR BLD: 2.6 %
ERYTHROCYTE [DISTWIDTH] IN BLOOD BY AUTOMATED COUNT: 13.2 %
EST. GFR  (AFRICAN AMERICAN): >60 ML/MIN/1.73 M^2
EST. GFR  (NON AFRICAN AMERICAN): >60 ML/MIN/1.73 M^2
FLUAV AG SPEC QL IA: NEGATIVE
FLUBV AG SPEC QL IA: NEGATIVE
GLUCOSE SERPL-MCNC: 94 MG/DL
HCT VFR BLD AUTO: 42.2 %
HGB BLD-MCNC: 14.3 G/DL
LYMPHOCYTES # BLD AUTO: 3.3 K/UL
LYMPHOCYTES NFR BLD: 41.9 %
MCH RBC QN AUTO: 28.8 PG
MCHC RBC AUTO-ENTMCNC: 33.9 G/DL
MCV RBC AUTO: 85 FL
MONOCYTES # BLD AUTO: 0.5 K/UL
MONOCYTES NFR BLD: 6.2 %
NEUTROPHILS # BLD AUTO: 3.8 K/UL
NEUTROPHILS NFR BLD: 48.9 %
PLATELET # BLD AUTO: 246 K/UL
PMV BLD AUTO: 10.1 FL
POTASSIUM SERPL-SCNC: 4.4 MMOL/L
PROT SERPL-MCNC: 7.1 G/DL
RBC # BLD AUTO: 4.96 M/UL
SODIUM SERPL-SCNC: 139 MMOL/L
SPECIMEN SOURCE: NORMAL
WBC # BLD AUTO: 7.75 K/UL

## 2017-12-30 PROCEDURE — 80053 COMPREHEN METABOLIC PANEL: CPT

## 2017-12-30 PROCEDURE — 25000003 PHARM REV CODE 250: Performed by: NURSE PRACTITIONER

## 2017-12-30 PROCEDURE — 87081 CULTURE SCREEN ONLY: CPT

## 2017-12-30 PROCEDURE — 85025 COMPLETE CBC W/AUTO DIFF WBC: CPT

## 2017-12-30 PROCEDURE — 99283 EMERGENCY DEPT VISIT LOW MDM: CPT

## 2017-12-30 PROCEDURE — 87880 STREP A ASSAY W/OPTIC: CPT

## 2017-12-30 PROCEDURE — 87400 INFLUENZA A/B EACH AG IA: CPT

## 2017-12-30 RX ORDER — METOCLOPRAMIDE HYDROCHLORIDE 5 MG/ML
10 INJECTION INTRAMUSCULAR; INTRAVENOUS
Status: DISCONTINUED | OUTPATIENT
Start: 2017-12-30 | End: 2017-12-30

## 2017-12-30 RX ORDER — METOCLOPRAMIDE 5 MG/1
10 TABLET ORAL
Status: COMPLETED | OUTPATIENT
Start: 2017-12-30 | End: 2017-12-30

## 2017-12-30 RX ORDER — DIPHENHYDRAMINE HCL 50 MG
50 CAPSULE ORAL
Status: COMPLETED | OUTPATIENT
Start: 2017-12-30 | End: 2017-12-30

## 2017-12-30 RX ADMIN — METOCLOPRAMIDE 10 MG: 5 TABLET ORAL at 03:12

## 2017-12-30 RX ADMIN — DIPHENHYDRAMINE HYDROCHLORIDE 50 MG: 50 CAPSULE ORAL at 03:12

## 2017-12-30 NOTE — DISCHARGE INSTRUCTIONS
Rest  Drink plenty of clear fluids   Nasal saline spray to clear nasal drainage and help with nasal congestion  Zyrtec or Claritin to help dry mucus and post nasal drip  Mucinex or Mucinex DM for cough and chest congestion  Tylenol or Ibuprofen for fever, headache and body aches  Warm salt water gargles for throat comfort  Chloraseptic spray or lozenges for throat comfort  RTC with no improvement or worsening    Regarding UPPER RESPIRATORY ILLNESS, for treatment, I encouraged patient to: drink plenty of fluids; get lots of rest; take medications as prescribed; use over-the-counter medications for symptomatic treatment;  and use a humidifier or steam in the bathroom to improve patency of upper airway.  Patient instructed to notify primary care provider, or return to the emergency department, if the they: have a cough most days or have a cough that returns frequently; begin coughing up blood; develop a high fever or shaking chills; have a low-grade fever for three or more days; develop thick, greenish mucus, especially if it has a bad smell; and experience shortness of breath or chest pain. For prevention, discussed with patient the importance of refraining from smoking (if applicable), getting annual influenza vaccines, reducing exposure to air pollution, and the need to frequently wash hands to avoid spread of infection.

## 2017-12-30 NOTE — ED PROVIDER NOTES
Encounter Date: 12/30/2017      SCRIBE #2 NOTE: I, Aura Morin, am scribing for, and in the presence of,  Mendoza Andersen MD. I have scribed the following portions of the note - Other sections scribed: ED Course, Disposition.     History     Chief Complaint   Patient presents with    flu like symptoms     fever started yesterday. sore throat, body aches, cough,      HPI   Mr Jhaveri is a 32 hear old male who present to [facility] Room with complaints body aches, headache, cough and fever that started on yesterday. He does not speak English, however girl is present and interpreted for him. He has history of Substance abuse. He currently rates headache 10 on 1-10 scale.     Review of patient's allergies indicates:   Allergen Reactions    Flexeril [cyclobenzaprine]     Ibuprofen     Toradol [ketorolac] Itching    Tramadol      Past Medical History:   Diagnosis Date    Anxiety     GERD (gastroesophageal reflux disease)     Hypertension      No pertinent past surgical history.  Family History   Problem Relation Age of Onset    Hypertension Mother     Diabetes Mother     Heart disease Father     Hypertension Father     Diabetes Father      Social History   Substance Use Topics    Smoking status: Current Every Day Smoker     Packs/day: 0.50     Types: Cigarettes    Smokeless tobacco: Never Used    Alcohol use No      Comment: quite drinking     Review of Systems   Constitutional: Positive for fever. Negative for chills, diaphoresis and fatigue.   HENT: Positive for sinus pressure and sore throat. Negative for congestion, ear discharge, rhinorrhea and trouble swallowing.    Eyes: Negative for discharge and visual disturbance.   Respiratory: Negative for apnea, cough, choking, chest tightness, shortness of breath, wheezing and stridor.    Cardiovascular: Negative for chest pain, palpitations and leg swelling.   Gastrointestinal: Negative for abdominal distention, abdominal pain, diarrhea, nausea and  vomiting.   Endocrine: Negative for cold intolerance and heat intolerance.   Genitourinary: Negative for dysuria, frequency and hematuria.   Musculoskeletal: Positive for back pain. Negative for arthralgias, myalgias and neck pain.   Skin: Negative for pallor and rash.   Neurological: Positive for headaches. Negative for dizziness, seizures, syncope and weakness.   Psychiatric/Behavioral: Negative for agitation, confusion and sleep disturbance.       Physical Exam     Initial Vitals [12/30/17 0031]   BP Pulse Resp Temp SpO2   (!) 159/73 80 19 98.7 °F (37.1 °C) 97 %      MAP       101.67         Physical Exam    Nursing note and vitals reviewed.  Constitutional: He appears well-developed and well-nourished.   HENT:   Nose: Right sinus exhibits maxillary sinus tenderness and frontal sinus tenderness. Left sinus exhibits maxillary sinus tenderness and frontal sinus tenderness.   Cardiovascular: Exam reveals no friction rub.    No murmur heard.  Pulmonary/Chest: No respiratory distress. He has no wheezes. He has no rhonchi. He has no rales. He exhibits no tenderness.   Abdominal: He exhibits no distension and no mass. There is no tenderness. There is no rebound and no guarding.   Musculoskeletal: He exhibits no edema or tenderness.   Neurological: He is alert and oriented to person, place, and time. He displays normal reflexes. No cranial nerve deficit or sensory deficit.         ED Course   Procedures     Results for orders placed or performed during the hospital encounter of 12/30/17   Rapid strep screen   Result Value Ref Range    Rapid Strep A Screen Negative Negative   Strep A culture, throat   Result Value Ref Range    Strep A Culture No  Group A  Streptococcus isolated    Influenza antigen Nasal Swab   Result Value Ref Range    Influenza A Ag, EIA Negative Negative    Influenza B Ag, EIA Negative Negative    Flu A & B Source Nasal Swab    CBC auto differential   Result Value Ref Range    WBC 7.75 3.90 - 12.70  K/uL    RBC 4.96 4.60 - 6.20 M/uL    Hemoglobin 14.3 14.0 - 18.0 g/dL    Hematocrit 42.2 40.0 - 54.0 %    MCV 85 82 - 98 fL    MCH 28.8 27.0 - 31.0 pg    MCHC 33.9 32.0 - 36.0 g/dL    RDW 13.2 11.5 - 14.5 %    Platelets 246 150 - 350 K/uL    MPV 10.1 9.2 - 12.9 fL    Gran # 3.8 1.8 - 7.7 K/uL    Lymph # 3.3 1.0 - 4.8 K/uL    Mono # 0.5 0.3 - 1.0 K/uL    Eos # 0.2 0.0 - 0.5 K/uL    Baso # 0.03 0.00 - 0.20 K/uL    Gran% 48.9 38.0 - 73.0 %    Lymph% 41.9 18.0 - 48.0 %    Mono% 6.2 4.0 - 15.0 %    Eosinophil% 2.6 0.0 - 8.0 %    Basophil% 0.4 0.0 - 1.9 %    Differential Method Automated    Comprehensive metabolic panel   Result Value Ref Range    Sodium 139 136 - 145 mmol/L    Potassium 4.4 3.5 - 5.1 mmol/L    Chloride 106 95 - 110 mmol/L    CO2 25 23 - 29 mmol/L    Glucose 94 70 - 110 mg/dL    BUN, Bld 17 6 - 20 mg/dL    Creatinine 1.1 0.5 - 1.4 mg/dL    Calcium 9.1 8.7 - 10.5 mg/dL    Total Protein 7.1 6.0 - 8.4 g/dL    Albumin 3.9 3.5 - 5.2 g/dL    Total Bilirubin 0.3 0.1 - 1.0 mg/dL    Alkaline Phosphatase 75 55 - 135 U/L    AST 20 10 - 40 U/L    ALT 21 10 - 44 U/L    Anion Gap 8 8 - 16 mmol/L    eGFR if African American >60 >60 mL/min/1.73 m^2    eGFR if non African American >60 >60 mL/min/1.73 m^2        Medical Decision Making:   Clinical Tests:   Lab Tests: Ordered and Reviewed            Scribe Attestation:   Scribe #1: I performed the above scribed service and the documentation accurately describes the services I performed. I attest to the accuracy of the note.    Attending Attestation:           Physician Attestation for Scribe:  Physician Attestation Statement for Scribe #1: I, Aura Morin, reviewed documentation, as scribed by Mendoza Andersen MD in my presence, and it is both accurate and complete.                 ED Discussion:     3:55 AM: Darien Dubose NP transfer care of patient to Dr. Andersen.    3:57 AM: Reassessed pt at this time.  Pt states his condition has improved at this time. Discussed  with pt all pertinent ED information and results. Discussed pt dx and plan of tx. Gave pt all f/u and return to the ED instructions. All questions and concerns were addressed at this time. Pt expresses understanding of information and instructions, and is comfortable with plan to discharge. Pt is stable for discharge.    Regarding UPPER RESPIRATORY ILLNESS, for treatment, I encouraged patient to: drink plenty of fluids; get lots of rest; take medications as prescribed; use over-the-counter medications for symptomatic treatment;  and use a humidifier or steam in the bathroom to improve patency of upper airway.  Patient instructed to notify primary care provider, or return to the emergency department, if the they: have a cough most days or have a cough that returns frequently; begin coughing up blood; develop a high fever or shaking chills; have a low-grade fever for three or more days; develop thick, greenish mucus, especially if it has a bad smell; and experience shortness of breath or chest pain. For prevention, discussed with patient the importance of refraining from smoking (if applicable), getting annual influenza vaccines, reducing exposure to air pollution, and the need to frequently wash hands to avoid spread of infection.     I discussed with patient and/or family/caretaker that evaluation in the ED does not suggest any emergent or life threatening medical conditions requiring immediate intervention beyond what was provided in the ED, and I believe patient is safe for discharge.  Regardless, an unremarkable evaluation in the ED does not preclude the development or presence of a serious of life threatening condition. As such, patient was instructed to return immediately for any worsening or change in current symptoms.    Clinical Impression:   The encounter diagnosis was Upper respiratory tract infection, unspecified type.    Disposition:   Disposition: Discharged  Condition: Stable                        Mendoza  Nathaly Loredo MD  01/11/18 7474

## 2018-01-01 LAB — BACTERIA THROAT CULT: NORMAL

## 2018-01-12 ENCOUNTER — HOSPITAL ENCOUNTER (EMERGENCY)
Facility: HOSPITAL | Age: 33
Discharge: HOME | End: 2018-01-12
Attending: EMERGENCY MEDICINE

## 2018-01-12 VITALS
OXYGEN SATURATION: 100 % | BODY MASS INDEX: 22.35 KG/M2 | TEMPERATURE: 98 F | SYSTOLIC BLOOD PRESSURE: 125 MMHG | WEIGHT: 164.81 LBS | HEART RATE: 85 BPM | RESPIRATION RATE: 20 BRPM | DIASTOLIC BLOOD PRESSURE: 95 MMHG

## 2018-01-12 DIAGNOSIS — K59.00 CONSTIPATION, UNSPECIFIED CONSTIPATION TYPE: Primary | ICD-10-CM

## 2018-01-12 DIAGNOSIS — Z20.828 EXPOSURE TO INFLUENZA: ICD-10-CM

## 2018-01-12 LAB
FLUAV AG SPEC QL IA: NEGATIVE
FLUBV AG SPEC QL IA: NEGATIVE
SPECIMEN SOURCE: NORMAL

## 2018-01-12 PROCEDURE — 87400 INFLUENZA A/B EACH AG IA: CPT

## 2018-01-12 PROCEDURE — 99283 EMERGENCY DEPT VISIT LOW MDM: CPT

## 2018-01-12 RX ORDER — LACTULOSE 10 G/15ML
15 SOLUTION ORAL 3 TIMES DAILY
Qty: 675 ML | Refills: 0 | Status: SHIPPED | OUTPATIENT
Start: 2018-01-12 | End: 2018-01-22

## 2018-01-12 NOTE — ED PROVIDER NOTES
"SCRIBE #1 NOTE: I, Anita Ralph, am scribing for, and in the presence of, Justin Diaz MD. I have scribed the entire note.      History      Chief Complaint   Patient presents with    Constipation     wife states "he hasn't had a BM in 3 weeks and I've given him the citrate de magnesium, dulcolax, enemas and nothing is coming out" also reports vomiting and fever       Review of patient's allergies indicates:   Allergen Reactions    Flexeril [cyclobenzaprine]     Ibuprofen     Toradol [ketorolac] Itching    Tramadol         HPI   HPI    1/12/2018, 4:58 PM   History obtained from pt's partner  HPI limited secondary to language barrier      History of Present Illness: Ney Jhaveri is a 32 y.o. male patient who presents to the Emergency Department for chronic constipation which onset gradually 3 weeks PTA. Symptoms are constant and moderate in severity. No mitigating or exacerbating factors reported. Associated sxs include subjective fever, n/v, and abdominal cramping with BM. Pt's partner also states pt has been exposed to the flu at home. Partner denies any diarrhea, hematochezia, hematuria, frequency, dysuria, CP, SOB, and all other sxs at this time. Prior Tx includes suppositories, enemas, and Magnesium Citrate with no relief of sxs. No further complaints or concerns at this time.        Arrival mode: Personal vehicle     PCP: Primary Doctor No       Past Medical History:  Past Medical History:   Diagnosis Date    Anxiety     GERD (gastroesophageal reflux disease)     Hypertension        Past Surgical History:  History reviewed. No pertinent surgical history.    Family History:  Family History   Problem Relation Age of Onset    Hypertension Mother     Diabetes Mother     Heart disease Father     Hypertension Father     Diabetes Father        Social History:  Social History     Social History Main Topics    Smoking status: Current Every Day Smoker     Packs/day: 0.50     Types: Cigarettes "    Smokeless tobacco: Never Used    Alcohol use No      Comment: quite drinking    Drug use: Yes     Types: Cocaine    Sexual activity: Yes       ROS   Review of Systems   Constitutional: Positive for fever (subjective).   HENT: Negative for sore throat.    Respiratory: Negative for shortness of breath.    Cardiovascular: Negative for chest pain.   Gastrointestinal: Positive for abdominal pain (cramping), constipation, nausea and vomiting. Negative for blood in stool and diarrhea.   Genitourinary: Negative for dysuria, frequency and hematuria.   Musculoskeletal: Negative for back pain.   Skin: Negative for rash.   Neurological: Negative for weakness.   Hematological: Does not bruise/bleed easily.   All other systems reviewed and are negative.    Physical Exam      Initial Vitals [01/12/18 1649]   BP Pulse Resp Temp SpO2   (!) 125/95 85 20 97.7 °F (36.5 °C) 100 %      MAP       105          Physical Exam  Nursing Notes and Vital Signs Reviewed.  Constitutional: Patient is in no acute distress. Well-developed and well-nourished.  Head: Atraumatic. Normocephalic.  Eyes: PERRL. EOM intact. Conjunctivae are not pale. No scleral icterus.  ENT: Mucous membranes are moist. Oropharynx is clear and symmetric.    Neck: Supple. Full ROM. No lymphadenopathy.  Cardiovascular: Regular rate. Regular rhythm. No murmurs, rubs, or gallops. Distal pulses are 2+ and symmetric.  Pulmonary/Chest: No respiratory distress. Clear to auscultation bilaterally. No wheezing or rales.  Abdominal: Soft and non-distended.  There is no tenderness.  No rebound, guarding, or rigidity. Good bowel sounds.  Genitourinary: No CVA tenderness  Musculoskeletal: Moves all extremities. No obvious deformities. No edema. No calf tenderness.  Skin: Warm and dry.  Neurological:  Alert, awake, and appropriate.  Normal speech.  No acute focal neurological deficits are appreciated.  Psychiatric: Normal affect. Good eye contact. Appropriate in content.    ED  Course    Procedures  ED Vital Signs:  Vitals:    01/12/18 1649   BP: (!) 125/95   Pulse: 85   Resp: 20   Temp: 97.7 °F (36.5 °C)   TempSrc: Oral   SpO2: 100%   Weight: 74.7 kg (164 lb 12.7 oz)       Abnormal Lab Results:  Labs Reviewed   INFLUENZA A AND B ANTIGEN        All Lab Results:  Results for orders placed or performed during the hospital encounter of 01/12/18   Influenza antigen Nasopharyngeal Swab   Result Value Ref Range    Influenza A Ag, EIA Negative Negative    Influenza B Ag, EIA Negative Negative    Flu A & B Source Nasopharyngeal Swab             The Emergency Provider reviewed the vital signs and test results, which are outlined above.    ED Discussion     Patient was offered RAHUL services. Patient does not want RAHUL services at this time. Full HPI is limited due to language barrier.     5:14 PM: Reviewed pt's records. Pt has had 4 CT's of the abdomen and pelvis within the past year. Spoke with pt with his partner translating about the risks of radiation exposure. Pt and partner do not wish to do another CT at this time. They would like pt to be tested for the flu and treated for constipation.    6:08 PM: Reassessed pt at this time.  Pt states his condition has improved at this time. Discussed with pt all pertinent ED information and results. Discussed pt dx and plan of tx. Gave pt all f/u and return to the ED instructions. All questions and concerns were addressed at this time. Pt expresses understanding of information and instructions, and is comfortable with plan to discharge. Pt is stable for discharge.    I discussed with patient and/or family/caretaker that evaluation in the ED does not suggest any emergent or life threatening medical conditions requiring immediate intervention beyond what was provided in the ED, and I believe patient is safe for discharge.  Regardless, an unremarkable evaluation in the ED does not preclude the development or presence of a serious of life threatening  condition. As such, patient was instructed to return immediately for any worsening or change in current symptoms.    ED Medication(s):  Medications - No data to display    New Prescriptions    LACTULOSE (CHRONULAC) 20 GRAM/30 ML SOLN    Take 22.5 mLs (15 g total) by mouth 3 (three) times daily.       Follow-up Information     Primary Doctor No. Call in 1 day.           [facility] - .    Specialty:  Emergency Medicine  Why:  If symptoms worsen  Contact information:  [address] [address]  [facility] [address] [phone]                   Medical Decision Making    Medical Decision Making:   Clinical Tests:   Lab Tests: Ordered and Reviewed           Scribe Attestation:   Scribe #1: I performed the above scribed service and the documentation accurately describes the services I performed. I attest to the accuracy of the note.    Attending:   Physician Attestation Statement for Scribe #1: I, Justin Diaz MD, personally performed the services described in this documentation, as scribed by Anita Ralph, in my presence, and it is both accurate and complete.          Clinical Impression       ICD-10-CM ICD-9-CM   1. Constipation, unspecified constipation type K59.00 564.00   2. Exposure to influenza Z20.828 V01.79       Disposition:   Disposition: Discharged  Condition: Stable         Justin Diaz MD  01/12/18 2021

## 2018-05-16 ENCOUNTER — HOSPITAL ENCOUNTER (EMERGENCY)
Facility: HOSPITAL | Age: 33
Discharge: LEFT BEFORE DISCHARGE | End: 2018-05-16
Attending: EMERGENCY MEDICINE

## 2018-05-16 VITALS
OXYGEN SATURATION: 97 % | BODY MASS INDEX: 21.29 KG/M2 | DIASTOLIC BLOOD PRESSURE: 83 MMHG | HEART RATE: 97 BPM | TEMPERATURE: 98 F | WEIGHT: 156.94 LBS | RESPIRATION RATE: 20 BRPM | SYSTOLIC BLOOD PRESSURE: 149 MMHG

## 2018-05-16 DIAGNOSIS — R10.9 ABDOMINAL PAIN, UNSPECIFIED ABDOMINAL LOCATION: Primary | ICD-10-CM

## 2018-05-16 LAB — OB PNL STL: NEGATIVE

## 2018-05-16 PROCEDURE — 99284 EMERGENCY DEPT VISIT MOD MDM: CPT

## 2018-05-16 PROCEDURE — 82272 OCCULT BLD FECES 1-3 TESTS: CPT

## 2018-05-16 RX ORDER — ONDANSETRON 2 MG/ML
4 INJECTION INTRAMUSCULAR; INTRAVENOUS
Status: DISCONTINUED | OUTPATIENT
Start: 2018-05-16 | End: 2018-05-16 | Stop reason: HOSPADM

## 2018-05-16 NOTE — ED NOTES
Went in patients room and not there. Xray here to  patient and cannot find him. Charge nurse made aware.

## 2018-05-16 NOTE — ED PROVIDER NOTES
"SCRIBE #1 NOTE: I, Za Feliz, am scribing for, and in the presence of, Hina Taylor MD. I have scribed the entire note.      History      Chief Complaint   Patient presents with    Abdominal Pain     was given a "sprite" at work to drink, now with abdominal pain.         Review of patient's allergies indicates:   Allergen Reactions    Flexeril [cyclobenzaprine]     Ibuprofen     Toradol [ketorolac] Itching    Tramadol         HPI   HPI    5/16/2018, 2:41 AM   History obtained from the patient      History of Present Illness: Ney Jhaveri is a 32 y.o. male patient who presents to the Emergency Department for generalized abdominal pain which onset gradually around 10pm. MAR[employer] used for translation. Patient reports being given an open soda by a coworker and began having pain shortlyafter drinking it.  Symptoms are constant and moderate in severity. The patient describes the sxs as "stong pain". No mitigating or exacerbating factors reported. Associated sxs include N/V. Prior tx includes Shayy [place] and unspecified pill with no relief. Patient denies any fever, chills, dysuria, hematuria, hematochezia, constipation, diarrhea, and all other sxs at this time. No further complaints or concerns at this time.     Arrival mode: Personal vehicle      PCP: Primary Doctor No       Past Medical History:  Past Medical History:   Diagnosis Date    Anxiety     GERD (gastroesophageal reflux disease)     Hypertension        Past Surgical History:  No past surgical history on file.      Family History:  Family History   Problem Relation Age of Onset    Hypertension Mother     Diabetes Mother     Heart disease Father     Hypertension Father     Diabetes Father        Social History:  Social History     Social History Main Topics    Smoking status: Current Every Day Smoker     Packs/day: 0.50     Types: Cigarettes    Smokeless tobacco: Never Used    Alcohol use No      Comment: quite drinking    Drug use: Yes     " Types: Cocaine    Sexual activity: Yes       ROS   Review of Systems   Constitutional: Negative for chills and fever.   HENT: Negative for sore throat.    Respiratory: Negative for shortness of breath.    Cardiovascular: Negative for chest pain.   Gastrointestinal: Positive for abdominal pain (generalized), nausea and vomiting. Negative for blood in stool, constipation and diarrhea.   Genitourinary: Negative for dysuria and hematuria.   Musculoskeletal: Negative for back pain.   Skin: Negative for rash.   Neurological: Negative for weakness.   Hematological: Does not bruise/bleed easily.   All other systems reviewed and are negative.      Physical Exam      Initial Vitals [05/16/18 0231]   BP Pulse Resp Temp SpO2   (!) 149/83 97 20 98.1 °F (36.7 °C) 97 %      MAP       105          Physical Exam  Nursing Notes and Vital Signs Reviewed.  Constitutional: Patient is in no acute distress. Well-developed and well-nourished.  Head: Atraumatic. Normocephalic.  Eyes: PERRL. EOM intact. Conjunctivae are not pale. No scleral icterus.  ENT: Mucous membranes are moist. Oropharynx is clear and symmetric.    Neck: Supple. Full ROM. No lymphadenopathy.  Cardiovascular: Regular rate. Regular rhythm. No murmurs, rubs, or gallops. Distal pulses are 2+ and symmetric.  Pulmonary/Chest: No respiratory distress. Clear to auscultation bilaterally. No wheezing or rales.  Abdominal: Soft and non-distended.  There is generalized tenderness.  No rebound, guarding, or rigidity. Good bowel sounds.  Genitourinary: No CVA tenderness  Musculoskeletal: Moves all extremities. No obvious deformities. No edema. No calf tenderness.  : External inspection is normal.  Penis is uncircumcised. No erythema, rash, or lesions. No penile discharge. Normal bilateral testicular lie and position. Scrotum and testes appear normal with no discoloration. No scrotal, testicular, or epididymal tenderness. No masses or hernias around the scrotum, testicles, or  inguinal canal.  Rectal:  No tenderness.  No masses.  No hemorrhoids.  Normal sphincter tone.    Skin: Warm and dry.  Neurological:  Alert, awake, and appropriate.  Normal speech.  No acute focal neurological deficits are appreciated.  Psychiatric: Normal affect. Good eye contact. Appropriate in content.    ED Course    Procedures  ED Vital Signs:  Vitals:    05/16/18 0231   BP: (!) 149/83   Pulse: 97   Resp: 20   Temp: 98.1 °F (36.7 °C)   TempSrc: Oral   SpO2: 97%   Weight: 71.2 kg (156 lb 15.5 oz)       Abnormal Lab Results:  Labs Reviewed   OCCULT BLOOD X 1, STOOL   CBC W/ AUTO DIFFERENTIAL   COMPREHENSIVE METABOLIC PANEL   LIPASE   URINALYSIS        All Lab Results:  Results for orders placed or performed during the hospital encounter of 05/16/18   Occult blood x 1, stool   Result Value Ref Range    Occult Blood Negative Negative       Imaging Results:  Imaging Results    None                 The Emergency Provider reviewed the vital signs and test results, which are outlined above.    ED Discussion     3:45 AM: Patient eloped.    ED Medication(s):  Medications - No data to display    Discharge Medication List as of 5/16/2018  3:45 AM                Medical Decision Making    Medical Decision Making:   Clinical Tests:   Lab Tests: Ordered and Reviewed           Scribe Attestation:   Scribe #1: I performed the above scribed service and the documentation accurately describes the services I performed. I attest to the accuracy of the note.    Attending:   Physician Attestation Statement for Scribe #1: I, Hina Taylor MD, personally performed the services described in this documentation, as scribed by Za Feliz, in my presence, and it is both accurate and complete.          Clinical Impression       ICD-10-CM ICD-9-CM   1. Abdominal pain, unspecified abdominal location R10.9 789.00       Disposition:   Disposition: Eloped  Condition: Fair         Hina Taylor MD  05/16/18 0528

## 2018-06-17 ENCOUNTER — HOSPITAL ENCOUNTER (EMERGENCY)
Facility: HOSPITAL | Age: 33
Discharge: LEFT BEFORE DISCHARGE | End: 2018-06-18
Attending: EMERGENCY MEDICINE

## 2018-06-17 VITALS
HEART RATE: 79 BPM | RESPIRATION RATE: 26 BRPM | HEIGHT: 74 IN | DIASTOLIC BLOOD PRESSURE: 95 MMHG | OXYGEN SATURATION: 100 % | SYSTOLIC BLOOD PRESSURE: 160 MMHG | TEMPERATURE: 98 F

## 2018-06-17 DIAGNOSIS — K59.00 CONSTIPATION, UNSPECIFIED CONSTIPATION TYPE: ICD-10-CM

## 2018-06-17 DIAGNOSIS — R10.84 GENERALIZED ABDOMINAL PAIN: Primary | ICD-10-CM

## 2018-06-17 LAB — POCT GLUCOSE: 100 MG/DL (ref 70–110)

## 2018-06-17 PROCEDURE — 82962 GLUCOSE BLOOD TEST: CPT

## 2018-06-17 PROCEDURE — 96361 HYDRATE IV INFUSION ADD-ON: CPT

## 2018-06-17 PROCEDURE — 99284 EMERGENCY DEPT VISIT MOD MDM: CPT | Mod: 25

## 2018-06-17 PROCEDURE — 96374 THER/PROPH/DIAG INJ IV PUSH: CPT

## 2018-06-18 LAB
ALBUMIN SERPL BCP-MCNC: 4.3 G/DL
ALP SERPL-CCNC: 72 U/L
ALT SERPL W/O P-5'-P-CCNC: 18 U/L
ANION GAP SERPL CALC-SCNC: 10 MMOL/L
AST SERPL-CCNC: 18 U/L
BASOPHILS # BLD AUTO: 0.02 K/UL
BASOPHILS NFR BLD: 0.1 %
BILIRUB SERPL-MCNC: 0.4 MG/DL
BUN SERPL-MCNC: 14 MG/DL
CALCIUM SERPL-MCNC: 9.2 MG/DL
CHLORIDE SERPL-SCNC: 104 MMOL/L
CO2 SERPL-SCNC: 24 MMOL/L
CREAT SERPL-MCNC: 1.2 MG/DL
DIFFERENTIAL METHOD: ABNORMAL
EOSINOPHIL # BLD AUTO: 0 K/UL
EOSINOPHIL NFR BLD: 0.2 %
ERYTHROCYTE [DISTWIDTH] IN BLOOD BY AUTOMATED COUNT: 13.5 %
EST. GFR  (AFRICAN AMERICAN): >60 ML/MIN/1.73 M^2
EST. GFR  (NON AFRICAN AMERICAN): >60 ML/MIN/1.73 M^2
GLUCOSE SERPL-MCNC: 117 MG/DL
HCT VFR BLD AUTO: 42.1 %
HGB BLD-MCNC: 14.4 G/DL
LIPASE SERPL-CCNC: 18 U/L
LYMPHOCYTES # BLD AUTO: 1.3 K/UL
LYMPHOCYTES NFR BLD: 8.1 %
MCH RBC QN AUTO: 29.3 PG
MCHC RBC AUTO-ENTMCNC: 34.2 G/DL
MCV RBC AUTO: 86 FL
MONOCYTES # BLD AUTO: 0.7 K/UL
MONOCYTES NFR BLD: 4.5 %
NEUTROPHILS # BLD AUTO: 14 K/UL
NEUTROPHILS NFR BLD: 87.1 %
PLATELET # BLD AUTO: 255 K/UL
PMV BLD AUTO: 9.7 FL
POTASSIUM SERPL-SCNC: 3.5 MMOL/L
PROT SERPL-MCNC: 7.1 G/DL
RBC # BLD AUTO: 4.91 M/UL
SODIUM SERPL-SCNC: 138 MMOL/L
WBC # BLD AUTO: 16.01 K/UL

## 2018-06-18 PROCEDURE — 83690 ASSAY OF LIPASE: CPT

## 2018-06-18 PROCEDURE — 80053 COMPREHEN METABOLIC PANEL: CPT

## 2018-06-18 PROCEDURE — 25000003 PHARM REV CODE 250: Performed by: EMERGENCY MEDICINE

## 2018-06-18 PROCEDURE — 85025 COMPLETE CBC W/AUTO DIFF WBC: CPT

## 2018-06-18 PROCEDURE — 63600175 PHARM REV CODE 636 W HCPCS: Performed by: EMERGENCY MEDICINE

## 2018-06-18 RX ORDER — ONDANSETRON 2 MG/ML
4 INJECTION INTRAMUSCULAR; INTRAVENOUS
Status: COMPLETED | OUTPATIENT
Start: 2018-06-18 | End: 2018-06-18

## 2018-06-18 RX ORDER — ACETAMINOPHEN 500 MG
1000 TABLET ORAL
Status: COMPLETED | OUTPATIENT
Start: 2018-06-18 | End: 2018-06-18

## 2018-06-18 RX ADMIN — SODIUM CHLORIDE 1000 ML: 0.9 INJECTION, SOLUTION INTRAVENOUS at 12:06

## 2018-06-18 RX ADMIN — ACETAMINOPHEN 1000 MG: 500 TABLET ORAL at 12:06

## 2018-06-18 RX ADMIN — ONDANSETRON 4 MG: 2 INJECTION INTRAMUSCULAR; INTRAVENOUS at 12:06

## 2018-06-18 NOTE — ED PROVIDER NOTES
SCRIBE #1 NOTE: I, Beverly Verduzco, am scribing for, and in the presence of, Mendoza Andersen Jr., MD. I have scribed the entire note.      History      Chief Complaint   Patient presents with    Abdominal Pain     epigastric and LLQ pain since AM    Diarrhea    Vomiting       Review of patient's allergies indicates:   Allergen Reactions    Pcn [penicillins] Anaphylaxis    Flexeril [cyclobenzaprine]     Ibuprofen     Toradol [ketorolac] Itching    Tramadol         HPI   HPI    6/18/2018, 12:11 AM   History obtained from the patient      History of Present Illness: Ney Jhaveri is a 32 y.o. male patient who presents to the Emergency Department for abdominal pain which onset suddenly yesterday AM. Pt c/o LLQ and epigastric pain. Symptoms are constant and moderate in severity.  No mitigating or exacerbating factors reported. Associated sxs include nausea and vomiting. Patient denies any diarrhea, fever, chills, diaphoresis, headache, CP, SOB, cough, and all other sxs at this time. No prior Tx included. No further complaints or concerns at this time.         Arrival mode: Personal vehicle     PCP: Primary Doctor No       Past Medical History:  Past Medical History:   Diagnosis Date    Anxiety     GERD (gastroesophageal reflux disease)     Hypertension        Past Surgical History:  No past surgical history on file.      Family History:  Family History   Problem Relation Age of Onset    Hypertension Mother     Diabetes Mother     Heart disease Father     Hypertension Father     Diabetes Father        Social History:  Social History     Social History Main Topics    Smoking status: Current Every Day Smoker     Packs/day: 0.50     Types: Cigarettes    Smokeless tobacco: Never Used    Alcohol use No      Comment: quite drinking    Drug use: Yes     Types: Cocaine    Sexual activity: Yes       ROS   Review of Systems   Constitutional: Negative for diaphoresis, fatigue and fever.   HENT: Negative for congestion  "and sore throat.    Respiratory: Negative for cough and shortness of breath.    Cardiovascular: Negative for chest pain.   Gastrointestinal: Positive for abdominal pain (LLQ and epigastric), nausea and vomiting. Negative for constipation and diarrhea.   Genitourinary: Negative for dysuria.   Musculoskeletal: Negative for back pain and myalgias.   Skin: Negative for rash.   Neurological: Negative for weakness, light-headedness and headaches.   Hematological: Does not bruise/bleed easily.       Physical Exam      Initial Vitals [06/17/18 2348]   BP Pulse Resp Temp SpO2   (!) 160/95 79 (!) 26 98.2 °F (36.8 °C) 100 %      MAP       --          Physical Exam  Nursing Notes and Vital Signs Reviewed.  Constitutional: Patient is in no acute distress. Well-developed and well-nourished.  Head: Atraumatic. Normocephalic.  Eyes: PERRL. EOM intact. Conjunctivae are not pale. No scleral icterus.  ENT: Mucous membranes are moist. Oropharynx is clear and symmetric.    Neck: Supple. Full ROM. No lymphadenopathy.  Cardiovascular: Regular rate. Regular rhythm. No murmurs, rubs, or gallops. Distal pulses are 2+ and symmetric.  Pulmonary/Chest: No respiratory distress. Clear to auscultation bilaterally. No wheezing or rales.  Abdominal: Soft and non-distended.  LLQ tenderness.  No rebound, guarding, or rigidity. Good bowel sounds.  Genitourinary: No CVA tenderness  Musculoskeletal: Moves all extremities. No obvious deformities.  Skin: Warm and dry.  Neurological:  Alert, awake, and appropriate.  Normal speech.  No acute focal neurological deficits are appreciated.  Psychiatric: Normal affect. Good eye contact. Appropriate in content.    ED Course    Procedures  ED Vital Signs:  Vitals:    06/17/18 2348   BP: (!) 160/95   Pulse: 79   Resp: (!) 26   Temp: 98.2 °F (36.8 °C)   TempSrc: Oral   SpO2: 100%   Height: 6' 2" (1.88 m)       Abnormal Lab Results:  Labs Reviewed   CBC W/ AUTO DIFFERENTIAL - Abnormal; Notable for the following:       "  Result Value    WBC 16.01 (*)     Gran # (ANC) 14.0 (*)     Gran% 87.1 (*)     Lymph% 8.1 (*)     All other components within normal limits   COMPREHENSIVE METABOLIC PANEL - Abnormal; Notable for the following:     Glucose 117 (*)     All other components within normal limits   LIPASE   POCT GLUCOSE        All Lab Results:  Results for orders placed or performed during the hospital encounter of 06/17/18   CBC W/ AUTO DIFFERENTIAL   Result Value Ref Range    WBC 16.01 (H) 3.90 - 12.70 K/uL    RBC 4.91 4.60 - 6.20 M/uL    Hemoglobin 14.4 14.0 - 18.0 g/dL    Hematocrit 42.1 40.0 - 54.0 %    MCV 86 82 - 98 fL    MCH 29.3 27.0 - 31.0 pg    MCHC 34.2 32.0 - 36.0 g/dL    RDW 13.5 11.5 - 14.5 %    Platelets 255 150 - 350 K/uL    MPV 9.7 9.2 - 12.9 fL    Gran # (ANC) 14.0 (H) 1.8 - 7.7 K/uL    Lymph # 1.3 1.0 - 4.8 K/uL    Mono # 0.7 0.3 - 1.0 K/uL    Eos # 0.0 0.0 - 0.5 K/uL    Baso # 0.02 0.00 - 0.20 K/uL    Gran% 87.1 (H) 38.0 - 73.0 %    Lymph% 8.1 (L) 18.0 - 48.0 %    Mono% 4.5 4.0 - 15.0 %    Eosinophil% 0.2 0.0 - 8.0 %    Basophil% 0.1 0.0 - 1.9 %    Differential Method Automated    Comp. Metabolic Panel   Result Value Ref Range    Sodium 138 136 - 145 mmol/L    Potassium 3.5 3.5 - 5.1 mmol/L    Chloride 104 95 - 110 mmol/L    CO2 24 23 - 29 mmol/L    Glucose 117 (H) 70 - 110 mg/dL    BUN, Bld 14 6 - 20 mg/dL    Creatinine 1.2 0.5 - 1.4 mg/dL    Calcium 9.2 8.7 - 10.5 mg/dL    Total Protein 7.1 6.0 - 8.4 g/dL    Albumin 4.3 3.5 - 5.2 g/dL    Total Bilirubin 0.4 0.1 - 1.0 mg/dL    Alkaline Phosphatase 72 55 - 135 U/L    AST 18 10 - 40 U/L    ALT 18 10 - 44 U/L    Anion Gap 10 8 - 16 mmol/L    eGFR if African American >60 >60 mL/min/1.73 m^2    eGFR if non African American >60 >60 mL/min/1.73 m^2   Lipase   Result Value Ref Range    Lipase 18 4 - 60 U/L   POCT glucose   Result Value Ref Range    POCT Glucose 100 70 - 110 mg/dL         Imaging Results:  Imaging Results          CT Abdomen Pelvis  Without Contrast (Final  result)  Result time 06/18/18 08:06:31    Final result by Charbel Nguyen MD (06/18/18 08:06:31)                 Impression:      Mild gastric distension and moderate constipation    All CT scans at this facility use dose modulation, iterative reconstruction, and/or weight based dosing when appropriate to reduce radiation dose to as low as reasonable achievable.      Electronically signed by: Charbel Nguyen MD  Date:    06/18/2018  Time:    08:06             Narrative:    EXAMINATION:  CT ABDOMEN PELVIS WITHOUT CONTRAST    CLINICAL HISTORY:  abd pain;    TECHNIQUE:  Low dose axial images, sagittal and coronal reformations were obtained from the lung bases to the pubic symphysis.    COMPARISON:  None    FINDINGS:  Heart: Normal size. No effusion.    Lung Bases: Clear.    Liver: Normal size and attenuation. No focal lesions.    Gallbladder: No calcified gallstones.    Bile Ducts: No dilatation.    Pancreas: No obvious mass. No peripancreatic fat stranding.    Spleen: Normal.    Adrenals: Normal.    Kidneys/Ureters: No mass, hydroureteronephrosis, or nephroureterolithiasis.    Bladder: No wall thickening.    Reproductive organs: Normal.    GI Tract/Mesentery: Mild gastric distention.  No evidence of bowel obstruction or inflammation.  Normal appendix.  Moderate constipation.    Peritoneal Space: No ascites or free air.    Retroperitoneum: No significant adenopathy.    Abdominal wall: Normal.    Vasculature: No aneurysm.    Bones: DJD is seen within the lower lumbar spine with pars defects at L5 with mild grade 1 spondylolisthesis at L5/S1.                             Per [facility], pt's CT results:  Impression: No  tract stones or hydronephrosis. Normal appendix. No diverticulitis.              The Emergency Provider reviewed the vital signs and test results, which are outlined above.    ED Discussion     2:32 AM: Patient has informed nursing staff He is leaving. Pt did not want to wait for CT results to come  back. Advised patient on the risks of leaving without being treated. Patient disconnected self from monitor, walked out without waiting for further workup. Able to ambulate without assistance or difficulty. AAO X3 and not intoxicated. Patient is eloping at this time.           ED Medication(s):  Medications   sodium chloride 0.9% bolus 1,000 mL (0 mLs Intravenous Stopped 6/18/18 0135)   ondansetron injection 4 mg (4 mg Intravenous Given 6/18/18 0048)   acetaminophen tablet 1,000 mg (1,000 mg Oral Given 6/18/18 0048)       Discharge Medication List as of 6/18/2018  3:05 AM                Medical Decision Making    Medical Decision Making:   Clinical Tests:   Lab Tests: Ordered and Reviewed  Radiological Study: Ordered and Reviewed           Scribe Attestation:   Scribe #1: I performed the above scribed service and the documentation accurately describes the services I performed. I attest to the accuracy of the note.    Attending:   Physician Attestation Statement for Scribe #1: I, Mendoza Andersen Jr., MD, personally performed the services described in this documentation, as scribed by Beverly Verduzco, in my presence, and it is both accurate and complete.          Clinical Impression       ICD-10-CM ICD-9-CM   1. Generalized abdominal pain R10.84 789.07   2. Constipation, unspecified constipation type K59.00 564.00       Disposition:   Disposition: Eloped  Condition: Stable         Mendoza Andersen Jr., MD  06/19/18 022

## 2018-06-18 NOTE — ED NOTES
Wife at nurses station asking to see MD  Explained to wife that MD was caring for a cardiac arrest and would be with them as soon as possible.

## 2018-06-18 NOTE — ED NOTES
Pt hyperventilating  Wife states pt needs meds for anxiety.  Informed wife that I would notify MD Dr Andersen aware

## 2018-06-19 PROBLEM — R10.84 GENERALIZED ABDOMINAL PAIN: Status: ACTIVE | Noted: 2018-06-19

## 2018-06-19 PROBLEM — K59.00 CONSTIPATION: Status: ACTIVE | Noted: 2018-06-19

## 2020-01-15 NOTE — ED NOTES
Refill  Acetaminophen/caitlyn campos    Wife again at NS  Again explained that MD was with critical pt .  Wife began cursing and states that they will go to another hospital